# Patient Record
Sex: FEMALE | Race: WHITE | NOT HISPANIC OR LATINO | Employment: OTHER | URBAN - NONMETROPOLITAN AREA
[De-identification: names, ages, dates, MRNs, and addresses within clinical notes are randomized per-mention and may not be internally consistent; named-entity substitution may affect disease eponyms.]

---

## 2022-08-30 ENCOUNTER — OFFICE VISIT (OUTPATIENT)
Dept: FAMILY MEDICINE CLINIC | Age: 82
End: 2022-08-30

## 2022-08-30 ENCOUNTER — LAB (OUTPATIENT)
Dept: LAB | Facility: HOSPITAL | Age: 82
End: 2022-08-30

## 2022-08-30 VITALS
HEIGHT: 63 IN | HEART RATE: 68 BPM | WEIGHT: 206 LBS | SYSTOLIC BLOOD PRESSURE: 138 MMHG | TEMPERATURE: 98.2 F | DIASTOLIC BLOOD PRESSURE: 63 MMHG | BODY MASS INDEX: 36.5 KG/M2

## 2022-08-30 DIAGNOSIS — E78.2 MIXED HYPERLIPIDEMIA: ICD-10-CM

## 2022-08-30 DIAGNOSIS — E66.01 MORBID OBESITY: Chronic | ICD-10-CM

## 2022-08-30 DIAGNOSIS — I87.2 CHRONIC VENOUS INSUFFICIENCY: ICD-10-CM

## 2022-08-30 DIAGNOSIS — I10 ESSENTIAL HYPERTENSION: Primary | ICD-10-CM

## 2022-08-30 DIAGNOSIS — E55.9 VITAMIN D DEFICIENCY: ICD-10-CM

## 2022-08-30 DIAGNOSIS — I63.9 CEREBROVASCULAR ACCIDENT (CVA), UNSPECIFIED MECHANISM: ICD-10-CM

## 2022-08-30 DIAGNOSIS — M79.7 FIBROMYALGIA: ICD-10-CM

## 2022-08-30 DIAGNOSIS — J30.1 SEASONAL ALLERGIC RHINITIS DUE TO POLLEN: ICD-10-CM

## 2022-08-30 DIAGNOSIS — Z79.899 OTHER LONG TERM (CURRENT) DRUG THERAPY: ICD-10-CM

## 2022-08-30 DIAGNOSIS — Z78.0 ASYMPTOMATIC POSTMENOPAUSAL STATE: ICD-10-CM

## 2022-08-30 DIAGNOSIS — R73.9 HYPERGLYCEMIA: ICD-10-CM

## 2022-08-30 DIAGNOSIS — I10 ESSENTIAL HYPERTENSION: ICD-10-CM

## 2022-08-30 DIAGNOSIS — Z12.31 BREAST CANCER SCREENING BY MAMMOGRAM: ICD-10-CM

## 2022-08-30 LAB
25(OH)D3 SERPL-MCNC: 82.3 NG/ML (ref 30–100)
ALBUMIN SERPL-MCNC: 4.3 G/DL (ref 3.5–5.2)
ALBUMIN/GLOB SERPL: 1.8 G/DL
ALP SERPL-CCNC: 45 U/L (ref 39–117)
ALT SERPL W P-5'-P-CCNC: 23 U/L (ref 1–33)
ANION GAP SERPL CALCULATED.3IONS-SCNC: 9.7 MMOL/L (ref 5–15)
AST SERPL-CCNC: 22 U/L (ref 1–32)
BILIRUB SERPL-MCNC: 0.5 MG/DL (ref 0–1.2)
BUN SERPL-MCNC: 12 MG/DL (ref 8–23)
BUN/CREAT SERPL: 17.9 (ref 7–25)
CALCIUM SPEC-SCNC: 9.7 MG/DL (ref 8.6–10.5)
CHLORIDE SERPL-SCNC: 99 MMOL/L (ref 98–107)
CHOLEST SERPL-MCNC: 201 MG/DL (ref 0–200)
CO2 SERPL-SCNC: 30.3 MMOL/L (ref 22–29)
CREAT SERPL-MCNC: 0.67 MG/DL (ref 0.57–1)
DEPRECATED RDW RBC AUTO: 47.2 FL (ref 37–54)
EGFRCR SERPLBLD CKD-EPI 2021: 88.5 ML/MIN/1.73
ERYTHROCYTE [DISTWIDTH] IN BLOOD BY AUTOMATED COUNT: 13.1 % (ref 12.3–15.4)
GLOBULIN UR ELPH-MCNC: 2.4 GM/DL
GLUCOSE SERPL-MCNC: 94 MG/DL (ref 65–99)
HBA1C MFR BLD: 6.2 % (ref 4.8–5.6)
HCT VFR BLD AUTO: 43.8 % (ref 34–46.6)
HDLC SERPL-MCNC: 47 MG/DL (ref 40–60)
HGB BLD-MCNC: 13.7 G/DL (ref 12–15.9)
LDLC SERPL CALC-MCNC: 124 MG/DL (ref 0–100)
LDLC/HDLC SERPL: 2.55 {RATIO}
MCH RBC QN AUTO: 30.3 PG (ref 26.6–33)
MCHC RBC AUTO-ENTMCNC: 31.3 G/DL (ref 31.5–35.7)
MCV RBC AUTO: 96.9 FL (ref 79–97)
PLATELET # BLD AUTO: 230 10*3/MM3 (ref 140–450)
PMV BLD AUTO: 9.5 FL (ref 6–12)
POTASSIUM SERPL-SCNC: 4.2 MMOL/L (ref 3.5–5.2)
PROT SERPL-MCNC: 6.7 G/DL (ref 6–8.5)
RBC # BLD AUTO: 4.52 10*6/MM3 (ref 3.77–5.28)
SODIUM SERPL-SCNC: 139 MMOL/L (ref 136–145)
TRIGL SERPL-MCNC: 171 MG/DL (ref 0–150)
TSH SERPL DL<=0.05 MIU/L-ACNC: 1.84 UIU/ML (ref 0.27–4.2)
VLDLC SERPL-MCNC: 30 MG/DL (ref 5–40)
WBC NRBC COR # BLD: 6.03 10*3/MM3 (ref 3.4–10.8)

## 2022-08-30 PROCEDURE — 82306 VITAMIN D 25 HYDROXY: CPT

## 2022-08-30 PROCEDURE — 84443 ASSAY THYROID STIM HORMONE: CPT

## 2022-08-30 PROCEDURE — 36415 COLL VENOUS BLD VENIPUNCTURE: CPT

## 2022-08-30 PROCEDURE — 80053 COMPREHEN METABOLIC PANEL: CPT

## 2022-08-30 PROCEDURE — 80061 LIPID PANEL: CPT

## 2022-08-30 PROCEDURE — 85027 COMPLETE CBC AUTOMATED: CPT

## 2022-08-30 PROCEDURE — 83036 HEMOGLOBIN GLYCOSYLATED A1C: CPT

## 2022-08-30 PROCEDURE — 99204 OFFICE O/P NEW MOD 45 MIN: CPT | Performed by: FAMILY MEDICINE

## 2022-08-30 RX ORDER — MULTIPLE VITAMINS W/ MINERALS TAB 9MG-400MCG
1 TAB ORAL DAILY
COMMUNITY

## 2022-08-30 RX ORDER — DULOXETIN HYDROCHLORIDE 20 MG/1
1 CAPSULE, DELAYED RELEASE ORAL DAILY
COMMUNITY
End: 2022-08-30 | Stop reason: SDUPTHER

## 2022-08-30 RX ORDER — DULOXETIN HYDROCHLORIDE 20 MG/1
20 CAPSULE, DELAYED RELEASE ORAL DAILY
Qty: 90 CAPSULE | Refills: 3 | Status: SHIPPED | OUTPATIENT
Start: 2022-08-30

## 2022-08-30 RX ORDER — VALSARTAN AND HYDROCHLOROTHIAZIDE 320; 12.5 MG/1; MG/1
1 TABLET, FILM COATED ORAL DAILY
COMMUNITY
End: 2022-08-30 | Stop reason: SDUPTHER

## 2022-08-30 RX ORDER — AMLODIPINE BESYLATE 5 MG/1
1 TABLET ORAL DAILY
COMMUNITY
End: 2022-08-30 | Stop reason: SDUPTHER

## 2022-08-30 RX ORDER — AMLODIPINE BESYLATE 5 MG/1
5 TABLET ORAL NIGHTLY
Qty: 90 TABLET | Refills: 3 | Status: SHIPPED | OUTPATIENT
Start: 2022-08-30

## 2022-08-30 RX ORDER — VALSARTAN AND HYDROCHLOROTHIAZIDE 320; 12.5 MG/1; MG/1
1 TABLET, FILM COATED ORAL DAILY
Qty: 90 TABLET | Refills: 3 | Status: SHIPPED | OUTPATIENT
Start: 2022-08-30

## 2022-08-30 RX ORDER — EZETIMIBE 10 MG/1
10 TABLET ORAL NIGHTLY
Qty: 90 TABLET | Refills: 3 | Status: SHIPPED | OUTPATIENT
Start: 2022-08-30

## 2022-08-30 RX ORDER — LORATADINE 10 MG/1
10 TABLET ORAL DAILY
COMMUNITY

## 2022-08-30 RX ORDER — AMMONIUM LACTATE 12 G/100G
CREAM TOPICAL 2 TIMES DAILY
Qty: 385 G | Refills: 3 | Status: SHIPPED | OUTPATIENT
Start: 2022-08-30

## 2022-08-30 RX ORDER — AMMONIUM LACTATE 12 G/100G
CREAM TOPICAL 2 TIMES DAILY
COMMUNITY
End: 2022-08-30 | Stop reason: SDUPTHER

## 2022-08-30 RX ORDER — FLUTICASONE PROPIONATE 50 MCG
1 SPRAY, SUSPENSION (ML) NASAL DAILY
COMMUNITY
End: 2022-08-30 | Stop reason: SDUPTHER

## 2022-08-30 RX ORDER — FLUTICASONE PROPIONATE 50 MCG
2 SPRAY, SUSPENSION (ML) NASAL DAILY
Qty: 48 G | Refills: 3 | Status: SHIPPED | OUTPATIENT
Start: 2022-08-30

## 2022-08-30 RX ORDER — EZETIMIBE 10 MG/1
1 TABLET ORAL DAILY
COMMUNITY
End: 2022-08-30 | Stop reason: SDUPTHER

## 2022-08-30 RX ORDER — ASPIRIN 81 MG/1
1 TABLET, CHEWABLE ORAL DAILY
COMMUNITY

## 2022-08-30 NOTE — PROGRESS NOTES
Stephani Etienne presents to [facility].    Chief Complaint:  To establish, blood pressure, cholesterol, fibromyalgia, chronic venous insufficiency    Subjective       History of Present Illness:  Stephani is being seen today to establish with us.  I see some other members of her family, and she is new to the area.  Her most significant past medical history includes 2 previous strokes.  She functions well, but she has been left with some difficulty with distinguishing her right from her left.  There has not been any significant change in this recently.  She continues on treatments for hypertension and hyperlipidemia.  She also has some other health problems as noted below for which she needs refills and possibly blood work.    Review of Systems:  Review of Systems   Constitutional: Negative for chills and fever.   Respiratory: Negative for cough and shortness of breath.    Cardiovascular: Negative for chest pain and palpitations.   Gastrointestinal: Negative for abdominal pain, nausea and vomiting.        Objective   Medical History:  Past Medical History:   • Essential hypertension   • Fibromyalgia   • History of DVT (deep vein thrombosis)   • Mixed hyperlipidemia   • Stroke (HCC)    x 2   • Vitamin D deficiency     Past Surgical History:   • BREAST BIOPSY    Benign   • CATARACT EXTRACTION   • POSTPARTUM TUBAL LIGATION   • SKIN CANCER EXCISION    Nose   • TOTAL HIP ARTHROPLASTY   • TOTAL KNEE ARTHROPLASTY   • VARICOSE VEIN SURGERY      Family History   Problem Relation Age of Onset   • No Known Problems Mother    • Stroke Father    • Lung cancer Sister    • COPD Brother    • Coronary artery disease Brother      Social History     Tobacco Use   • Smoking status: Never Smoker   • Smokeless tobacco: Never Used   Substance Use Topics   • Alcohol use: Never       Health Maintenance Due   Topic Date Due   • DXA SCAN  Never done   • ZOSTER VACCINE (1 of 2) Never done   • Pneumococcal Vaccine  "65+ (1 - PCV) Never done   • ANNUAL WELLNESS VISIT  Never done   • LIPID PANEL  Never done        Immunization History   Administered Date(s) Administered   • COVID-19 (PFIZER) PURPLE CAP 02/24/2021, 03/17/2021, 05/31/2022   • FLUAD TRI 65YR+ 11/08/2016, 10/09/2018   • Fluzone High Dose =>65 Years ([employer] ONLY) 12/20/2017, 11/06/2019   • Fluzone High-Dose 65+yrs 12/11/2020   • Tdap 11/04/2014       Allergies   Allergen Reactions   • Atorvastatin Myalgia   • Elastic Bandages & [Zinc] Other (See Comments)     \"Im allergic to any kind of elastic\"   • Morphine Other (See Comments)   • Oxycodone-Acetaminophen GI Intolerance   • Pravastatin Other (See Comments)     \"just makes me feel bad\"   • Valproic Acid Unknown - Low Severity   • Iodine Rash   • Mercury Rash        Medications:  Current Outpatient Medications on File Prior to Visit   Medication Sig   • aspirin 81 MG chewable tablet Chew 1 tablet Daily.   • loratadine (Claritin) 10 MG tablet Take 10 mg by mouth Daily.   • multivitamin with minerals tablet tablet Take 1 tablet by mouth Daily.   • polyethyl glycol-propyl glycol (SYSTANE) 0.4-0.3 % solution ophthalmic solution (artificial tears) Administer 1 drop to both eyes Daily As Needed.   • vitamin D3 (Vitamin D) 125 MCG (5000 UT) capsule capsule Take 5,000 Units by mouth Daily.   • [DISCONTINUED] amLODIPine (NORVASC) 5 MG tablet Take 1 tablet by mouth Daily.   • [DISCONTINUED] ammonium lactate (AMLACTIN) 12 % cream 2 (Two) Times a Day. ammonium lactate 12 % topical cream   • [DISCONTINUED] DULoxetine (CYMBALTA) 20 MG capsule Take 1 capsule by mouth Daily.   • [DISCONTINUED] ezetimibe (ZETIA) 10 MG tablet Take 1 tablet by mouth Daily.   • [DISCONTINUED] fluticasone (FLONASE) 50 MCG/ACT nasal spray 1 spray by Each Nare route Daily.   • [DISCONTINUED] valsartan-hydrochlorothiazide (DIOVAN-HCT) 320-12.5 MG per tablet Take 1 tablet by mouth Daily.     No current facility-administered medications on file prior to visit. " "      Vital Signs:   /63 (BP Location: Right arm, Patient Position: Sitting)   Pulse 68   Temp 98.2 °F (36.8 °C) (Oral)   Ht 160 cm (63\")   Wt 93.4 kg (206 lb)   BMI 36.49 kg/m²       Physical Exam:  Physical Exam  Vitals and nursing note reviewed.   Constitutional:       General: She is not in acute distress.     Appearance: She is obese. She is not ill-appearing.   HENT:      Right Ear: Tympanic membrane and ear canal normal.      Left Ear: Tympanic membrane and ear canal normal.      Mouth/Throat:      Mouth: Mucous membranes are moist.      Comments: Pharynx appears normal  Eyes:      Extraocular Movements: Extraocular movements intact.      Pupils: Pupils are equal, round, and reactive to light.   Neck:      Thyroid: No thyromegaly.   Cardiovascular:      Rate and Rhythm: Normal rate and regular rhythm.      Heart sounds: No murmur heard.  Pulmonary:      Effort: Pulmonary effort is normal.      Breath sounds: Normal breath sounds.   Abdominal:      General: There is no distension.      Palpations: Abdomen is soft. There is no mass.      Tenderness: There is no abdominal tenderness.   Musculoskeletal:      Cervical back: Normal range of motion.      Right lower leg: Edema (There is some edema of both lower legs.  This is more significant on the left side where there is significant skin changes from chronic venous insufficiency.) present.      Left lower leg: Edema present.   Skin:     Findings: No lesion or rash.   Neurological:      General: No focal deficit present.      Mental Status: She is oriented to person, place, and time.      Cranial Nerves: No cranial nerve deficit.   Psychiatric:         Mood and Affect: Mood normal.         Result Review      The following data was reviewed by Toño Chiang MD on 08/30/2022.  Testing from [place] provider.         Assessment and Plan:   Today, we have reviewed Stephani's care.  She seems to be doing fairly well at this time and is getting " acclimated to the area.  We will refill needed medications and update labs as noted below.  Referrals for mammogram and DEXA.  We will plan to see her back in about 6 months for wellness exam.  No other short-term change is anticipated.       Diagnoses and all orders for this visit:    1. Essential hypertension (Primary)  -     Comprehensive Metabolic Panel; Future  -     valsartan-hydrochlorothiazide (DIOVAN-HCT) 320-12.5 MG per tablet; Take 1 tablet by mouth Daily.  Dispense: 90 tablet; Refill: 3  -     amLODIPine (NORVASC) 5 MG tablet; Take 1 tablet by mouth Every Night.  Dispense: 90 tablet; Refill: 3    2. Mixed hyperlipidemia  -     Comprehensive Metabolic Panel; Future  -     Lipid Panel; Future  -     TSH; Future  -     ezetimibe (ZETIA) 10 MG tablet; Take 1 tablet by mouth Every Night.  Dispense: 90 tablet; Refill: 3    3. Cerebrovascular accident (CVA), unspecified mechanism (HCC)  -     CBC (No Diff); Future    4. Hyperglycemia  -     Hemoglobin A1c; Future    5. Morbid obesity (HCC)  Comments:  Continue efforts toward gradual weight loss.    6. Chronic venous insufficiency  -     ammonium lactate (AMLACTIN) 12 % cream; Apply  topically to the appropriate area as directed 2 (Two) Times a Day. ammonium lactate 12 % topical cream  Dispense: 385 g; Refill: 3    7. Vitamin D deficiency  -     Vitamin D 25 hydroxy; Future    8. Other long term (current) drug therapy  -     CBC (No Diff); Future    9. Seasonal allergic rhinitis due to pollen  -     fluticasone (FLONASE) 50 MCG/ACT nasal spray; 2 sprays into the nostril(s) as directed by provider Daily.  Dispense: 48 g; Refill: 3    10. Fibromyalgia  -     DULoxetine (CYMBALTA) 20 MG capsule; Take 1 capsule by mouth Daily.  Dispense: 90 capsule; Refill: 3    11. Breast cancer screening by mammogram  -     Mammo Screening Digital Tomosynthesis Bilateral With CAD; Future    12. Asymptomatic postmenopausal state  -     DEXA Bone Density Axial;  Future          Follow Up   Return in about 6 months (around 2/28/2023) for Recheck, Medicare Wellness.  Patient was given instructions and counseling regarding her condition or for health maintenance advice. Please see specific information pulled into the AVS if appropriate.

## 2022-10-24 ENCOUNTER — APPOINTMENT (OUTPATIENT)
Dept: MAMMOGRAPHY | Facility: HOSPITAL | Age: 82
End: 2022-10-24

## 2022-10-25 ENCOUNTER — HOSPITAL ENCOUNTER (OUTPATIENT)
Dept: MAMMOGRAPHY | Facility: HOSPITAL | Age: 82
Discharge: HOME | End: 2022-10-25

## 2022-10-25 ENCOUNTER — HOSPITAL ENCOUNTER (OUTPATIENT)
Dept: BONE DENSITY | Facility: HOSPITAL | Age: 82
Discharge: HOME | End: 2022-10-25

## 2022-10-25 DIAGNOSIS — Z12.31 BREAST CANCER SCREENING BY MAMMOGRAM: ICD-10-CM

## 2022-10-25 DIAGNOSIS — Z78.0 ASYMPTOMATIC POSTMENOPAUSAL STATE: ICD-10-CM

## 2022-10-25 PROCEDURE — 77063 BREAST TOMOSYNTHESIS BI: CPT

## 2022-10-25 PROCEDURE — 77080 DXA BONE DENSITY AXIAL: CPT

## 2022-10-25 PROCEDURE — 77067 SCR MAMMO BI INCL CAD: CPT

## 2023-02-15 ENCOUNTER — OFFICE VISIT (OUTPATIENT)
Dept: FAMILY MEDICINE CLINIC | Age: 83
End: 2023-02-15
Payer: MEDICARE

## 2023-02-15 VITALS
WEIGHT: 205.8 LBS | BODY MASS INDEX: 36.46 KG/M2 | SYSTOLIC BLOOD PRESSURE: 129 MMHG | DIASTOLIC BLOOD PRESSURE: 73 MMHG | HEIGHT: 63 IN | HEART RATE: 90 BPM

## 2023-02-15 DIAGNOSIS — N95.0 POST-MENOPAUSAL BLEEDING: ICD-10-CM

## 2023-02-15 DIAGNOSIS — N93.9 VAGINAL BLEEDING: Primary | ICD-10-CM

## 2023-02-15 DIAGNOSIS — R31.9 HEMATURIA, UNSPECIFIED TYPE: ICD-10-CM

## 2023-02-15 LAB
BILIRUB BLD-MCNC: NEGATIVE MG/DL
CLARITY, POC: CLEAR
COLOR UR: YELLOW
EXPIRATION DATE: ABNORMAL
GLUCOSE UR STRIP-MCNC: NEGATIVE MG/DL
KETONES UR QL: NEGATIVE
LEUKOCYTE EST, POC: ABNORMAL
Lab: ABNORMAL
NITRITE UR-MCNC: NEGATIVE MG/ML
PH UR: 7.5 [PH] (ref 5–8)
PROT UR STRIP-MCNC: ABNORMAL MG/DL
RBC # UR STRIP: ABNORMAL /UL
SP GR UR: 1.01 (ref 1–1.03)
UROBILINOGEN UR QL: ABNORMAL

## 2023-02-15 PROCEDURE — 81003 URINALYSIS AUTO W/O SCOPE: CPT | Performed by: NURSE PRACTITIONER

## 2023-02-15 PROCEDURE — 87086 URINE CULTURE/COLONY COUNT: CPT | Performed by: NURSE PRACTITIONER

## 2023-02-15 PROCEDURE — 99214 OFFICE O/P EST MOD 30 MIN: CPT | Performed by: NURSE PRACTITIONER

## 2023-02-15 NOTE — PROGRESS NOTES
"Chief Complaint  Stephani Etienne presents to [facility] for Vaginal Bleeding (Pt states she noticed today, bright red blood, pt denies any pain)      Subjective     History of Present Illness  Ms. Etienne reports that she noticed blood when wiping started this morning.  She noticed that she had a spot of blood on her underwear. She is unsure if this is coming from her rectum, urethra or vaginal area.  No current symptoms including denies abdominal pain, rectal pain or pressure, or vaginal pain.  No history of any vaginal complaints in self or family. She takes a baby asa daily.           Assessment and Plan       Diagnoses and all orders for this visit:    1. Vaginal bleeding (Primary)  Comments:  stat transvaginal US and referral to GYN for management  Orders:  -     US Non-ob Transvaginal; Future  -     Ambulatory Referral to Gynecology    2. Post-menopausal bleeding  Comments:  referral to GYN  Orders:  -     US Non-ob Transvaginal; Future  -     Ambulatory Referral to Gynecology    3. Hematuria, unspecified type  Comments:  suspect this is from the vaginal bleeding- will send for culture for evaluation   Orders:  -     Urine Culture - Urine, Urine, Clean Catch  -     POCT urinalysis dipstick, automated        Follow Up   Return for Pending imaging results.      No orders of the defined types were placed in this encounter.      There are no discontinued medications.         Review of Systems   Genitourinary: Positive for vaginal bleeding. Negative for pelvic pain and pelvic pressure.       Objective     Vitals:    02/15/23 1342   BP: 129/73   BP Location: Left arm   Patient Position: Sitting   Pulse: 90   Weight: 93.4 kg (205 lb 12.8 oz)   Height: 160 cm (63\")     Body mass index is 36.46 kg/m².     Physical Exam  Constitutional:       General: She is not in acute distress.     Appearance: Normal appearance.   HENT:      Head: Normocephalic.   Cardiovascular:      Rate and " "Rhythm: Normal rate and regular rhythm.   Pulmonary:      Effort: Pulmonary effort is normal.      Breath sounds: Normal breath sounds.   Abdominal:      Tenderness: There is no abdominal tenderness.   Genitourinary:     Exam position: Knee-chest position.      Vagina: Bleeding present. Other prolapse of vaginal walls w/o mention of uterine prolapse: unsure if bladder/uterine prolapse vs mass  unable to determine due to blocking vaginal canal.   Musculoskeletal:         General: Normal range of motion.   Neurological:      General: No focal deficit present.      Mental Status: She is alert and oriented to person, place, and time.   Psychiatric:         Mood and Affect: Mood normal.         Behavior: Behavior normal.            Result Review                       Allergies   Allergen Reactions   • Atorvastatin Myalgia   • Elastic Bandages & [Zinc] Other (See Comments)     \"Im allergic to any kind of elastic\"   • Morphine Other (See Comments)   • Oxycodone-Acetaminophen GI Intolerance   • Pravastatin Other (See Comments)     \"just makes me feel bad\"   • Valproic Acid Unknown - Low Severity   • Iodine Rash   • Mercury Rash      Past Medical History:   Diagnosis Date   • Essential hypertension    • Fibromyalgia    • History of DVT (deep vein thrombosis)    • Mixed hyperlipidemia    • Stroke (HCC)     x 2   • Vitamin D deficiency      Current Outpatient Medications   Medication Sig Dispense Refill   • amLODIPine (NORVASC) 5 MG tablet Take 1 tablet by mouth Every Night. 90 tablet 3   • ammonium lactate (AMLACTIN) 12 % cream Apply  topically to the appropriate area as directed 2 (Two) Times a Day. ammonium lactate 12 % topical cream 385 g 3   • aspirin 81 MG chewable tablet Chew 1 tablet Daily.     • DULoxetine (CYMBALTA) 20 MG capsule Take 1 capsule by mouth Daily. 90 capsule 3   • ezetimibe (ZETIA) 10 MG tablet Take 1 tablet by mouth Every Night. 90 tablet 3   • fluticasone (FLONASE) 50 MCG/ACT nasal spray 2 sprays into " the nostril(s) as directed by provider Daily. 48 g 3   • loratadine (CLARITIN) 10 MG tablet Take 10 mg by mouth Daily.     • multivitamin with minerals tablet tablet Take 1 tablet by mouth Daily.     • polyethyl glycol-propyl glycol (SYSTANE) 0.4-0.3 % solution ophthalmic solution (artificial tears) Administer 1 drop to both eyes Daily As Needed.     • valsartan-hydrochlorothiazide (DIOVAN-HCT) 320-12.5 MG per tablet Take 1 tablet by mouth Daily. 90 tablet 3   • vitamin D3 125 MCG (5000 UT) capsule capsule Take 5,000 Units by mouth Daily.       No current facility-administered medications for this visit.     Past Surgical History:   Procedure Laterality Date   • BREAST BIOPSY      Benign   • CATARACT EXTRACTION     • POSTPARTUM TUBAL LIGATION     • SKIN CANCER EXCISION      Nose   • TOTAL HIP ARTHROPLASTY Right    • TOTAL KNEE ARTHROPLASTY Left    • VARICOSE VEIN SURGERY        Health Maintenance Due   Topic Date Due   • ZOSTER VACCINE (1 of 2) Never done   • Pneumococcal Vaccine 65+ (1 - PCV) Never done   • COVID-19 Vaccine (4 - Booster for Pfizer series) 07/26/2022   • INFLUENZA VACCINE  08/01/2022   • ANNUAL WELLNESS VISIT  02/02/2023      Immunization History   Administered Date(s) Administered   • COVID-19 (PFIZER) PURPLE CAP 02/24/2021, 03/17/2021, 05/31/2022   • FLUAD TRI 65YR+ 11/08/2016, 10/09/2018   • Fluzone High Dose =>65 Years ([employer] ONLY) 12/20/2017, 11/06/2019   • Fluzone High-Dose 65+yrs 12/11/2020   • Tdap 11/04/2014

## 2023-02-16 ENCOUNTER — HOSPITAL ENCOUNTER (OUTPATIENT)
Dept: ULTRASOUND IMAGING | Facility: HOSPITAL | Age: 83
Discharge: HOME | End: 2023-02-16
Admitting: NURSE PRACTITIONER
Payer: MEDICARE

## 2023-02-16 DIAGNOSIS — N93.9 VAGINAL BLEEDING: ICD-10-CM

## 2023-02-16 DIAGNOSIS — N95.0 POST-MENOPAUSAL BLEEDING: ICD-10-CM

## 2023-02-16 PROCEDURE — 76830 TRANSVAGINAL US NON-OB: CPT

## 2023-02-17 LAB — BACTERIA SPEC AEROBE CULT: NORMAL

## 2023-06-06 ENCOUNTER — OFFICE VISIT (OUTPATIENT)
Dept: FAMILY MEDICINE CLINIC | Age: 83
End: 2023-06-06
Payer: MEDICARE

## 2023-06-06 VITALS
HEART RATE: 66 BPM | TEMPERATURE: 98.2 F | HEIGHT: 63 IN | WEIGHT: 203.6 LBS | SYSTOLIC BLOOD PRESSURE: 138 MMHG | DIASTOLIC BLOOD PRESSURE: 56 MMHG | BODY MASS INDEX: 36.07 KG/M2

## 2023-06-06 DIAGNOSIS — I63.9 CEREBROVASCULAR ACCIDENT (CVA), UNSPECIFIED MECHANISM: ICD-10-CM

## 2023-06-06 DIAGNOSIS — E78.2 MIXED HYPERLIPIDEMIA: ICD-10-CM

## 2023-06-06 DIAGNOSIS — M79.7 FIBROMYALGIA: ICD-10-CM

## 2023-06-06 DIAGNOSIS — J30.1 SEASONAL ALLERGIC RHINITIS DUE TO POLLEN: ICD-10-CM

## 2023-06-06 DIAGNOSIS — Z23 ENCOUNTER FOR IMMUNIZATION: ICD-10-CM

## 2023-06-06 DIAGNOSIS — I87.2 CHRONIC VENOUS INSUFFICIENCY: ICD-10-CM

## 2023-06-06 DIAGNOSIS — I10 ESSENTIAL HYPERTENSION: ICD-10-CM

## 2023-06-06 DIAGNOSIS — Z00.00 PHYSICAL EXAM: Primary | ICD-10-CM

## 2023-06-06 RX ORDER — AMLODIPINE BESYLATE 5 MG/1
5 TABLET ORAL NIGHTLY
Qty: 90 TABLET | Refills: 3 | Status: SHIPPED | OUTPATIENT
Start: 2023-06-06

## 2023-06-06 RX ORDER — VALSARTAN AND HYDROCHLOROTHIAZIDE 320; 12.5 MG/1; MG/1
1 TABLET, FILM COATED ORAL DAILY
Qty: 90 TABLET | Refills: 3 | Status: SHIPPED | OUTPATIENT
Start: 2023-06-06

## 2023-06-06 RX ORDER — AMMONIUM LACTATE 12 G/100G
CREAM TOPICAL 2 TIMES DAILY
Qty: 385 G | Refills: 3 | Status: SHIPPED | OUTPATIENT
Start: 2023-06-06

## 2023-06-06 RX ORDER — EZETIMIBE 10 MG/1
10 TABLET ORAL NIGHTLY
Qty: 90 TABLET | Refills: 3 | Status: SHIPPED | OUTPATIENT
Start: 2023-06-06

## 2023-06-06 RX ORDER — FLUTICASONE PROPIONATE 50 MCG
2 SPRAY, SUSPENSION (ML) NASAL DAILY
Qty: 48 G | Refills: 3 | Status: SHIPPED | OUTPATIENT
Start: 2023-06-06

## 2023-06-06 RX ORDER — ACETAMINOPHEN 500 MG
500 TABLET ORAL EVERY 6 HOURS PRN
COMMUNITY

## 2023-06-06 RX ORDER — DULOXETIN HYDROCHLORIDE 20 MG/1
20 CAPSULE, DELAYED RELEASE ORAL DAILY
Qty: 90 CAPSULE | Refills: 3 | Status: SHIPPED | OUTPATIENT
Start: 2023-06-06

## 2023-06-06 NOTE — PROGRESS NOTES
The ABCs of the Annual Wellness Visit  Subsequent Medicare Wellness Visit    Subjective    Stephani Etienne is a 82 y.o. female who presents for a Subsequent Medicare Wellness Visit.    The following portions of the patient's history were reviewed and updated as appropriate: allergies, current medications, past family history, past medical history, past social history, past surgical history, and problem list.    Compared to one year ago, the patient feels her physical health is the same.    Compared to one year ago, the patient feels her mental health is the same.    Recent Hospitalizations:  She was admitted within the past 365 days at [facility] for hysterectomy.    Current Medical Providers:  Patient Care Team:  Toño Chiang MD as PCP - General (Family Medicine)  Raul Adams MD as Consulting Physician (Radiation Oncology)  Alvaro Mackey DO (Obstetrics and Gynecology)    Outpatient Medications Prior to Visit   Medication Sig Dispense Refill    acetaminophen (TYLENOL) 500 MG tablet Take 1 tablet by mouth Every 6 (Six) Hours As Needed.      aspirin 81 MG chewable tablet Chew 1 tablet Daily.      Cholecalciferol 50 MCG (2000 UT) tablet Take  by mouth Daily.      multivitamin with minerals tablet tablet Take 1 tablet by mouth Daily.      polyethyl glycol-propyl glycol (SYSTANE) 0.4-0.3 % solution ophthalmic solution (artificial tears) Administer 1 drop to both eyes Daily As Needed.      amLODIPine (NORVASC) 5 MG tablet Take 1 tablet by mouth Every Night. 90 tablet 3    ammonium lactate (AMLACTIN) 12 % cream Apply  topically to the appropriate area as directed 2 (Two) Times a Day. ammonium lactate 12 % topical cream 385 g 3    DULoxetine (CYMBALTA) 20 MG capsule Take 1 capsule by mouth Daily. 90 capsule 3    ezetimibe (ZETIA) 10 MG tablet Take 1 tablet by mouth Every Night. 90 tablet 3    fluticasone (FLONASE) 50 MCG/ACT nasal spray 2 sprays into the nostril(s) as directed by provider  "Daily. 48 g 3    valsartan-hydrochlorothiazide (DIOVAN-HCT) 320-12.5 MG per tablet Take 1 tablet by mouth Daily. 90 tablet 3    loratadine (CLARITIN) 10 MG tablet Take 10 mg by mouth Daily.      vitamin D3 125 MCG (5000 UT) capsule capsule Take 5,000 Units by mouth Daily.       No facility-administered medications prior to visit.       No opioid medication identified on active medication list. I have reviewed chart for other potential  high risk medication/s and harmful drug interactions in the elderly.      Aspirin is on active medication list. Aspirin use is indicated based on review of current medical condition/s. Pros and cons of this therapy have been discussed today. Benefits of this medication outweigh potential harm.  Patient has been encouraged to continue taking this medication.  .      Patient Active Problem List   Diagnosis    Essential hypertension    Mixed hyperlipidemia    Stroke    Vitamin D deficiency    Fibromyalgia    Chronic venous insufficiency    Morbid obesity     Advance Care Planning  Advance Directive is on file.  ACP discussion was held with the patient during this visit. Patient has an advance directive in EMR which is still valid.      Objective    Vitals:    06/06/23 1107   BP: 138/56   BP Location: Right arm   Patient Position: Sitting   Pulse: 66   Temp: 98.2 °F (36.8 °C)   TempSrc: Oral   Weight: 92.4 kg (203 lb 9.6 oz)   Height: 160 cm (62.99\")     Estimated body mass index is 36.08 kg/m² as calculated from the following:    Height as of this encounter: 160 cm (62.99\").    Weight as of this encounter: 92.4 kg (203 lb 9.6 oz).    Class 2 Severe Obesity (BMI >=35 and <=39.9). Obesity-related health conditions include the following: hypertension and dyslipidemias. Obesity is  mildly better compared to last visit . BMI is is above average; BMI management plan is completed. We discussed portion control and increasing exercise.    Does the patient have evidence of cognitive impairment? " No.        HEALTH RISK ASSESSMENT    Smoking Status:  Social History     Tobacco Use   Smoking Status Never   Smokeless Tobacco Never     Alcohol Consumption:  Social History     Substance and Sexual Activity   Alcohol Use Never     Fall Risk Screen:    KENDYADI Fall Risk Assessment was completed, and patient is at LOW risk for falls.Assessment completed on:2023    Depression Screenin/6/2023    11:02 AM   PHQ-2/PHQ-9 Depression Screening   Little Interest or Pleasure in Doing Things 0-->not at all   Feeling Down, Depressed or Hopeless 0-->not at all   PHQ-9: Brief Depression Severity Measure Score 0       Health Habits and Functional and Cognitive Screenin/6/2023    11:02 AM   Functional & Cognitive Status   Do you have difficulty preparing food and eating? Yes   Do you have difficulty bathing yourself, getting dressed or grooming yourself? No   Do you have difficulty using the toilet? No   Do you have difficulty moving around from place to place? No   Do you have trouble with steps or getting out of a bed or a chair? Yes   Current Diet Well Balanced Diet   Dental Exam Up to date   Eye Exam Up to date   Exercise (times per week) 0 times per week   Current Exercises Include No Regular Exercise   Do you need help using the phone?  No   Are you deaf or do you have serious difficulty hearing?  No   Do you need help with transportation? Yes   Do you need help shopping? No   Do you need help preparing meals?  Yes   Do you need help with housework?  Yes   Do you need help with laundry? No   Do you need help taking your medications? No   Do you need help managing money? No   Do you ever drive or ride in a car without wearing a seat belt? No   Have you felt unusual stress, anger or loneliness in the last month? No   Who do you live with? Child   If you need help, do you have trouble finding someone available to you? No   Have you been bothered in the last four weeks by sexual problems? No   Do you have  difficulty concentrating, remembering or making decisions? No       Age-appropriate Screening Schedule:  Refer to the list below for future screening recommendations based on patient's age, sex and/or medical conditions. Orders for these recommended tests are listed in the plan section. The patient has been provided with a written plan.    Health Maintenance   Topic Date Due    ZOSTER VACCINE (1 of 2) Never done    Pneumococcal Vaccine 65+ (1 - PCV) Never done    COVID-19 Vaccine (4 - Pfizer series) 06/05/2024 (Originally 7/26/2022)    INFLUENZA VACCINE  08/01/2023    LIPID PANEL  08/30/2023    ANNUAL WELLNESS VISIT  06/06/2024    DXA SCAN  10/25/2024    TDAP/TD VACCINES (2 - Td or Tdap) 11/04/2024                CMS Preventative Services Quick Reference  Risk Factors Identified During Encounter  Depression/Dysphoria: Current medication is effective, no change recommended  Hearing Problem:  Continue using hearing aids and follow up with audiology as needed.  Immunizations Discussed/Encouraged: Prevnar 20 (Pneumococcal 20-valent conjugate), Shingrix, and COVID19  The above risks/problems have been discussed with the patient.  Pertinent information has been shared with the patient in the After Visit Summary.  An After Visit Summary and PPPS were made available to the patient.    Follow Up:   Next Medicare Wellness visit to be scheduled in 1 year.       Additional E&M Note during same encounter follows:  Patient has multiple medical problems which are significant and separately identifiable that require additional work above and beyond the Medicare Wellness Visit.      Chief Complaint:  Blood pressure, cholesterol, previous stroke    Subjective        In addition to the Medicare wellness exam, Stephani is here for follow-up on her usual care.  She has hypertension and remains on treatments for it as noted above.  Her blood pressure is fairly well controlled here.  She says that her blood pressure has been in a relatively  "good range most recently.  She is not aware of obvious side effects from the medication.    She also has elevated cholesterol and remains on ezetimibe for this.  She seems to tolerate that fairly well.  She has had difficulty with statin therapy previously.    Review of Systems:  Review of Systems   Constitutional:  Negative for chills and fever.   Respiratory:  Negative for cough and shortness of breath.    Cardiovascular:  Negative for chest pain and palpitations.   Gastrointestinal:  Negative for abdominal pain, nausea and vomiting.      Objective   Vital Signs:  /56 (BP Location: Right arm, Patient Position: Sitting)   Pulse 66   Temp 98.2 °F (36.8 °C) (Oral)   Ht 160 cm (62.99\")   Wt 92.4 kg (203 lb 9.6 oz)   BMI 36.08 kg/m²     Physical Exam  Vitals and nursing note reviewed.   Constitutional:       General: She is not in acute distress.     Appearance: She is obese. She is not ill-appearing.   HENT:      Right Ear: Tympanic membrane and ear canal normal.      Left Ear: Tympanic membrane and ear canal normal.      Ears:      Comments: Hearing is normal with forced whisper with aids in place.     Mouth/Throat:      Mouth: Mucous membranes are moist.      Comments: Pharynx appears normal  Eyes:      Extraocular Movements: Extraocular movements intact.      Pupils: Pupils are equal, round, and reactive to light.      Comments: Binocular vision is 20/20 without correction.   Neck:      Thyroid: No thyromegaly.   Cardiovascular:      Rate and Rhythm: Normal rate and regular rhythm.      Heart sounds: No murmur heard.  Pulmonary:      Effort: Pulmonary effort is normal.      Breath sounds: Normal breath sounds.   Abdominal:      General: There is no distension.      Palpations: Abdomen is soft. There is no mass.      Tenderness: There is no abdominal tenderness.   Musculoskeletal:      Cervical back: Normal range of motion.      Right lower leg: Edema present.      Left lower leg: Edema (There is some " edema of both lower legs. This is more significant on the left side where there is significant skin changes from chronic venous insufficiency.) present.   Skin:     Findings: No lesion or rash.   Neurological:      General: No focal deficit present.      Mental Status: She is oriented to person, place, and time.      Cranial Nerves: No cranial nerve deficit.   Psychiatric:         Mood and Affect: Mood normal.     The following data was reviewed by Toño Chiang MD on 06/06/2023.  Lab Results   Component Value Date    WBC 8.51 03/16/2023    HGB 11.3 (L) 03/16/2023    HCT 35.6 (L) 03/16/2023    MCV 97.5 03/16/2023     03/16/2023     Lab Results   Component Value Date    GLUCOSE 94 08/30/2022    BUN 12 08/30/2022    CREATININE 0.67 08/30/2022     08/30/2022    K 4.2 08/30/2022    CL 99 08/30/2022    CO2 30.3 (H) 08/30/2022    CALCIUM 9.7 08/30/2022    PROTEINTOT 6.7 08/30/2022    ALBUMIN 4.30 08/30/2022    ALT 23 08/30/2022    AST 22 08/30/2022    ALKPHOS 45 08/30/2022    BILITOT 0.5 08/30/2022    EGFR 88.5 08/30/2022    GLOB 2.4 08/30/2022    AGRATIO 1.8 08/30/2022    BCR 17.9 08/30/2022    ANIONGAP 9.7 08/30/2022      Lab Results   Component Value Date    CHOL 201 (H) 08/30/2022    TRIG 171 (H) 08/30/2022    HDL 47 08/30/2022     (H) 08/30/2022     Lab Results   Component Value Date    TSH 1.840 08/30/2022     Lab Results   Component Value Date    HGBA1C 5.9 (H) 03/02/2023      CBC AND DIFFERENTIAL (03/16/2023 04:26)  BASIC METABOLIC PANEL (03/16/2023 04:26)  MAGNESIUM (03/16/2023 04:26)  PHOSPHORUS (03/16/2023 04:26)  CBC AND DIFFERENTIAL (03/15/2023 22:19)         Assessment and Plan:       Today, we have reviewed her care.  Regarding the wellness exam, I am unsure about her vaccine history, and they indicated they might bring some records to review in this regard.  I did send Shingrix to her pharmacy in [place] to consider.    Regarding her usual care, we will refill needed  medications as noted.  Her most recent labs have been reviewed as well.  No short-term changes otherwise anticipated.  Tentative follow-up will be in about 6 months.    Diagnoses and all orders for this visit:    1. Physical exam (Primary)    2. Essential hypertension  -     amLODIPine (NORVASC) 5 MG tablet; Take 1 tablet by mouth Every Night.  Dispense: 90 tablet; Refill: 3  -     valsartan-hydrochlorothiazide (DIOVAN-HCT) 320-12.5 MG per tablet; Take 1 tablet by mouth Daily.  Dispense: 90 tablet; Refill: 3    3. Mixed hyperlipidemia  -     ezetimibe (ZETIA) 10 MG tablet; Take 1 tablet by mouth Every Night.  Dispense: 90 tablet; Refill: 3    4. Cerebrovascular accident (CVA), unspecified mechanism  Comments:  As above.    5. Chronic venous insufficiency  -     ammonium lactate (AMLACTIN) 12 % cream; Apply  topically to the appropriate area as directed 2 (Two) Times a Day. ammonium lactate 12 % topical cream  Dispense: 385 g; Refill: 3    6. Fibromyalgia  -     DULoxetine (CYMBALTA) 20 MG capsule; Take 1 capsule by mouth Daily.  Dispense: 90 capsule; Refill: 3    7. Seasonal allergic rhinitis due to pollen  -     fluticasone (FLONASE) 50 MCG/ACT nasal spray; 2 sprays into the nostril(s) as directed by provider Daily.  Dispense: 48 g; Refill: 3    8. Encounter for immunization  -     Zoster Vac Recomb Adjuvanted 50 MCG/0.5ML reconstituted suspension; Inject 0.5 mL into the appropriate muscle as directed by prescriber 1 (One) Time for 1 dose.  Dispense: 1 each; Refill: 1       Follow Up   Return in about 6 months (around 12/6/2023) for Recheck.  Patient was given instructions and counseling regarding her condition or for health maintenance advice. Please see specific information pulled into the AVS if appropriate.

## 2023-08-17 ENCOUNTER — TRANSCRIBE ORDERS (OUTPATIENT)
Dept: ADMINISTRATIVE | Facility: HOSPITAL | Age: 83
End: 2023-08-17
Payer: MEDICARE

## 2023-08-17 DIAGNOSIS — Z12.31 SCREENING MAMMOGRAM, ENCOUNTER FOR: Primary | ICD-10-CM

## 2023-10-01 DIAGNOSIS — I10 ESSENTIAL HYPERTENSION: ICD-10-CM

## 2023-10-01 DIAGNOSIS — M79.7 FIBROMYALGIA: ICD-10-CM

## 2023-10-01 DIAGNOSIS — E78.2 MIXED HYPERLIPIDEMIA: ICD-10-CM

## 2023-10-02 RX ORDER — DULOXETIN HYDROCHLORIDE 20 MG/1
20 CAPSULE, DELAYED RELEASE ORAL DAILY
Qty: 90 CAPSULE | Refills: 2 | Status: SHIPPED | OUTPATIENT
Start: 2023-10-02

## 2023-10-02 RX ORDER — AMLODIPINE BESYLATE 5 MG/1
5 TABLET ORAL NIGHTLY
Qty: 90 TABLET | Refills: 2 | Status: SHIPPED | OUTPATIENT
Start: 2023-10-02

## 2023-10-02 RX ORDER — EZETIMIBE 10 MG/1
10 TABLET ORAL NIGHTLY
Qty: 90 TABLET | Refills: 2 | Status: SHIPPED | OUTPATIENT
Start: 2023-10-02

## 2023-10-02 RX ORDER — VALSARTAN AND HYDROCHLOROTHIAZIDE 320; 12.5 MG/1; MG/1
1 TABLET, FILM COATED ORAL DAILY
Qty: 90 TABLET | Refills: 2 | Status: SHIPPED | OUTPATIENT
Start: 2023-10-02

## 2023-11-10 ENCOUNTER — HOSPITAL ENCOUNTER (OUTPATIENT)
Dept: MAMMOGRAPHY | Facility: HOSPITAL | Age: 83
Discharge: HOME | End: 2023-11-10
Admitting: FAMILY MEDICINE
Payer: MEDICARE

## 2023-11-10 DIAGNOSIS — Z12.31 SCREENING MAMMOGRAM, ENCOUNTER FOR: ICD-10-CM

## 2023-11-10 PROCEDURE — 77063 BREAST TOMOSYNTHESIS BI: CPT

## 2023-11-10 PROCEDURE — 77067 SCR MAMMO BI INCL CAD: CPT

## 2023-12-14 ENCOUNTER — OFFICE VISIT (OUTPATIENT)
Dept: FAMILY MEDICINE CLINIC | Age: 83
End: 2023-12-14
Payer: MEDICARE

## 2023-12-14 VITALS
DIASTOLIC BLOOD PRESSURE: 64 MMHG | HEART RATE: 67 BPM | WEIGHT: 204 LBS | BODY MASS INDEX: 36.14 KG/M2 | TEMPERATURE: 97.3 F | HEIGHT: 63 IN | SYSTOLIC BLOOD PRESSURE: 134 MMHG

## 2023-12-14 DIAGNOSIS — M79.7 FIBROMYALGIA: ICD-10-CM

## 2023-12-14 DIAGNOSIS — Z91.89 DRIVING SAFETY ISSUE: ICD-10-CM

## 2023-12-14 DIAGNOSIS — I10 ESSENTIAL HYPERTENSION: Primary | ICD-10-CM

## 2023-12-14 DIAGNOSIS — I87.2 CHRONIC VENOUS INSUFFICIENCY: ICD-10-CM

## 2023-12-14 DIAGNOSIS — E78.2 MIXED HYPERLIPIDEMIA: ICD-10-CM

## 2023-12-14 PROCEDURE — 3078F DIAST BP <80 MM HG: CPT | Performed by: FAMILY MEDICINE

## 2023-12-14 PROCEDURE — 99214 OFFICE O/P EST MOD 30 MIN: CPT | Performed by: FAMILY MEDICINE

## 2023-12-14 PROCEDURE — 1159F MED LIST DOCD IN RCRD: CPT | Performed by: FAMILY MEDICINE

## 2023-12-14 PROCEDURE — 3075F SYST BP GE 130 - 139MM HG: CPT | Performed by: FAMILY MEDICINE

## 2023-12-14 PROCEDURE — 1160F RVW MEDS BY RX/DR IN RCRD: CPT | Performed by: FAMILY MEDICINE

## 2023-12-14 RX ORDER — EZETIMIBE 10 MG/1
10 TABLET ORAL NIGHTLY
Qty: 90 TABLET | Refills: 3 | Status: SHIPPED | OUTPATIENT
Start: 2023-12-14

## 2023-12-14 RX ORDER — AMLODIPINE BESYLATE 5 MG/1
5 TABLET ORAL NIGHTLY
Qty: 90 TABLET | Refills: 3 | Status: SHIPPED | OUTPATIENT
Start: 2023-12-14

## 2023-12-14 RX ORDER — AMMONIUM LACTATE 12 G/100G
CREAM TOPICAL 2 TIMES DAILY
Qty: 385 G | Refills: 3 | Status: SHIPPED | OUTPATIENT
Start: 2023-12-14

## 2023-12-14 RX ORDER — VALSARTAN AND HYDROCHLOROTHIAZIDE 320; 12.5 MG/1; MG/1
1 TABLET, FILM COATED ORAL DAILY
Qty: 90 TABLET | Refills: 3 | Status: SHIPPED | OUTPATIENT
Start: 2023-12-14

## 2023-12-14 RX ORDER — DULOXETIN HYDROCHLORIDE 20 MG/1
20 CAPSULE, DELAYED RELEASE ORAL DAILY
Qty: 90 CAPSULE | Refills: 3 | Status: SHIPPED | OUTPATIENT
Start: 2023-12-14
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
## 2024-03-21 ENCOUNTER — OFFICE VISIT (OUTPATIENT)
Dept: FAMILY MEDICINE CLINIC | Age: 84
End: 2024-03-21
Payer: MEDICARE

## 2024-03-21 VITALS
TEMPERATURE: 98.1 F | DIASTOLIC BLOOD PRESSURE: 63 MMHG | OXYGEN SATURATION: 98 % | HEIGHT: 63 IN | SYSTOLIC BLOOD PRESSURE: 168 MMHG | BODY MASS INDEX: 34.59 KG/M2 | WEIGHT: 195.2 LBS | HEART RATE: 65 BPM

## 2024-03-21 DIAGNOSIS — L98.9 SKIN LESION: Primary | ICD-10-CM

## 2024-03-21 DIAGNOSIS — Z85.828 HISTORY OF SKIN CANCER: ICD-10-CM

## 2024-03-21 PROCEDURE — 99213 OFFICE O/P EST LOW 20 MIN: CPT | Performed by: PHYSICIAN ASSISTANT

## 2024-03-21 PROCEDURE — 1159F MED LIST DOCD IN RCRD: CPT | Performed by: PHYSICIAN ASSISTANT

## 2024-03-21 PROCEDURE — 1160F RVW MEDS BY RX/DR IN RCRD: CPT | Performed by: PHYSICIAN ASSISTANT

## 2024-03-21 PROCEDURE — 3078F DIAST BP <80 MM HG: CPT | Performed by: PHYSICIAN ASSISTANT

## 2024-03-21 PROCEDURE — 3077F SYST BP >= 140 MM HG: CPT | Performed by: PHYSICIAN ASSISTANT

## 2024-03-21 NOTE — PROGRESS NOTES
"Subjective     CHIEF COMPLAINT    Chief Complaint   Patient presents with    Rash     Bilateral arms. X 3 months             History of Present Illness  This is an 83-year-old female presenting to the clinic with concern for some skin lesions on her forearms.  She has a past medical history of skin cancer in several places, most notably on her nose that required somewhat extensive Mohs procedure.  She says the lesion on her left arm has been present 3 to 4 months and the lesion on her right arm has been present maybe 2 to 3 months.  The lesion on her right arm is pruritic but neither are painful.  She has been putting vinegar on them at home with minimal improvement.            Review of Systems   Skin:  Positive for wound (Lesions, bilateral forearms).            Past Medical History:   Diagnosis Date    Endometrial cancer     Essential hypertension     Fibromyalgia     History of DVT (deep vein thrombosis)     Mixed hyperlipidemia     Stroke     x 2    Vitamin D deficiency             Past Surgical History:   Procedure Laterality Date    BREAST BIOPSY      Benign    CATARACT EXTRACTION      POSTPARTUM TUBAL LIGATION      SKIN CANCER EXCISION      Nose    TOTAL HIP ARTHROPLASTY Right     TOTAL KNEE ARTHROPLASTY Left     TOTAL LAPAROSCOPIC HYSTERECTOMY AND SALPINGECTOMY      VARICOSE VEIN SURGERY              Family History   Problem Relation Age of Onset    No Known Problems Mother     Stroke Father     Lung cancer Sister     COPD Brother     Coronary artery disease Brother             Social History     Socioeconomic History    Marital status:     Number of children: 2   Tobacco Use    Smoking status: Never    Smokeless tobacco: Never   Substance and Sexual Activity    Alcohol use: Never            Allergies   Allergen Reactions    Atorvastatin Myalgia    Elastic Bandages & [Zinc] Other (See Comments)     \"Im allergic to any kind of elastic\"    Morphine Other (See Comments)    Oxycodone-Acetaminophen GI " "Intolerance    Pravastatin Other (See Comments)     \"just makes me feel bad\"    Valproic Acid Unknown - Low Severity    Iodine Rash    Latex Rash     +ALLERGY TESTING , RX TO ALL ELASTIC PRODUCTS    Mercury Rash    Na Hyalur & Na Chond-Na Hyalur Rash    Red Dye Rash     RX FROM ANYTHING W/RED COLORING PER PT            Current Outpatient Medications on File Prior to Visit   Medication Sig Dispense Refill    acetaminophen (TYLENOL) 500 MG tablet Take 1 tablet by mouth Every 6 (Six) Hours As Needed.      amLODIPine (NORVASC) 5 MG tablet Take 1 tablet by mouth Every Night. 90 tablet 3    ammonium lactate (AMLACTIN) 12 % cream Apply  topically to the appropriate area as directed 2 (Two) Times a Day. ammonium lactate 12 % topical cream 385 g 3    aspirin 81 MG chewable tablet Chew 1 tablet Daily.      Cholecalciferol 50 MCG (2000 UT) tablet Take  by mouth Daily.      DULoxetine (CYMBALTA) 20 MG capsule Take 1 capsule by mouth Daily. 90 capsule 3    ezetimibe (ZETIA) 10 MG tablet Take 1 tablet by mouth Every Night. 90 tablet 3    fluticasone (FLONASE) 50 MCG/ACT nasal spray 2 sprays into the nostril(s) as directed by provider Daily. 48 g 3    multivitamin with minerals tablet tablet Take 1 tablet by mouth Daily.      polyethyl glycol-propyl glycol (SYSTANE) 0.4-0.3 % solution ophthalmic solution (artificial tears) Administer 1 drop to both eyes Daily As Needed.      valsartan-hydrochlorothiazide (DIOVAN-HCT) 320-12.5 MG per tablet Take 1 tablet by mouth Daily. 90 tablet 3     No current facility-administered medications on file prior to visit.            /63   Pulse 65   Temp 98.1 °F (36.7 °C) (Oral)   Ht 160 cm (62.99\")   Wt 88.5 kg (195 lb 3.2 oz)   SpO2 98% Comment: room air  BMI 34.59 kg/m²          Objective     Physical Exam  Vitals and nursing note reviewed.   Constitutional:       General: She is not in acute distress.     Appearance: Normal appearance.   Eyes:      General: No scleral icterus.     " Conjunctiva/sclera: Conjunctivae normal.   Pulmonary:      Effort: Pulmonary effort is normal. No respiratory distress.   Skin:     General: Skin is warm and dry.      Findings: Lesion (See images below) present.   Neurological:      Mental Status: She is alert and oriented to person, place, and time.   Psychiatric:         Mood and Affect: Mood normal.         Behavior: Behavior normal.                               Assessment & Plan  Skin lesion  Referral to dermatology placed.  Advised that is probably okay to continue with white vinegar as this is what her former dermatologist had advised her to do.  History of skin cancer                     FOR FULL DISCHARGE INSTRUCTIONS/COMMENTS/HANDOUTS please see the   AVS

## 2024-06-11 ENCOUNTER — OFFICE VISIT (OUTPATIENT)
Dept: FAMILY MEDICINE CLINIC | Age: 84
End: 2024-06-11
Payer: MEDICARE

## 2024-06-11 VITALS
TEMPERATURE: 98 F | SYSTOLIC BLOOD PRESSURE: 148 MMHG | HEIGHT: 63 IN | DIASTOLIC BLOOD PRESSURE: 83 MMHG | WEIGHT: 192.2 LBS | BODY MASS INDEX: 34.05 KG/M2 | HEART RATE: 69 BPM | OXYGEN SATURATION: 96 %

## 2024-06-11 DIAGNOSIS — Z23 IMMUNIZATION DUE: ICD-10-CM

## 2024-06-11 DIAGNOSIS — Z00.00 MEDICARE ANNUAL WELLNESS VISIT, SUBSEQUENT: Primary | ICD-10-CM

## 2024-06-11 DIAGNOSIS — H91.93 DECREASED HEARING OF BOTH EARS: ICD-10-CM

## 2024-06-11 DIAGNOSIS — Z13.31 ENCOUNTER FOR SCREENING FOR DEPRESSION: ICD-10-CM

## 2024-06-11 PROCEDURE — 1159F MED LIST DOCD IN RCRD: CPT

## 2024-06-11 PROCEDURE — 3079F DIAST BP 80-89 MM HG: CPT

## 2024-06-11 PROCEDURE — G0439 PPPS, SUBSEQ VISIT: HCPCS

## 2024-06-11 PROCEDURE — 1170F FXNL STATUS ASSESSED: CPT

## 2024-06-11 PROCEDURE — 3077F SYST BP >= 140 MM HG: CPT

## 2024-06-11 PROCEDURE — 1160F RVW MEDS BY RX/DR IN RCRD: CPT

## 2024-06-11 RX ORDER — ACETAMINOPHEN 160 MG
2000 TABLET,DISINTEGRATING ORAL DAILY
COMMUNITY

## 2024-06-11 NOTE — PROGRESS NOTES
The ABCs of the Annual Wellness Visit  Subsequent Medicare Wellness Visit    Subjective      Stephani Etienne is a 83 y.o. female who presents for a Subsequent Medicare Wellness Visit.    Patient presents today for Medicare wellness visit.  She is reportedly up-to-date on her shingles vaccine.  She is due for an RSV vaccine, COVID booster, pneumonia vaccine.  She declines all vaccines.  She is up-to-date on her DEXA scan, completed .  She no longer requires colonoscopies due to age.  She requests mammograms yearly and she is up-to-date on this, last completed November 2023 which was normal.  She is up-to-date on her eye and dental exams.    The following portions of the patient's history were reviewed and   updated as appropriate: allergies, current medications, past family history, past medical history, past social history, past surgical history, and problem list.    Compared to one year ago, the patient feels her physical   health is better.    Compared to one year ago, the patient feels her mental   health is better.    Recent Hospitalizations:  She was not admitted to the hospital during the last year.       Current Medical Providers:  Patient Care Team:  Toño Chiang MD as PCP - General (Family Medicine)  Raul Adams MD as Consulting Physician (Radiation Oncology)  Alvaro Mackey DO (Obstetrics and Gynecology)    Outpatient Medications Prior to Visit   Medication Sig Dispense Refill    acetaminophen (TYLENOL) 500 MG tablet Take 1 tablet by mouth Every 6 (Six) Hours As Needed.      amLODIPine (NORVASC) 5 MG tablet Take 1 tablet by mouth Every Night. 90 tablet 3    ammonium lactate (AMLACTIN) 12 % cream Apply  topically to the appropriate area as directed 2 (Two) Times a Day. ammonium lactate 12 % topical cream 385 g 3    aspirin 81 MG chewable tablet Chew 1 tablet Daily.      Cholecalciferol (Vitamin D3) 50 MCG (2000 UT) capsule Take 1 capsule by mouth Daily.      DULoxetine (CYMBALTA)  "20 MG capsule Take 1 capsule by mouth Daily. 90 capsule 3    ezetimibe (ZETIA) 10 MG tablet Take 1 tablet by mouth Every Night. 90 tablet 3    fluticasone (FLONASE) 50 MCG/ACT nasal spray 2 sprays into the nostril(s) as directed by provider Daily. 48 g 3    multivitamin with minerals tablet tablet Take 1 tablet by mouth Daily.      polyethyl glycol-propyl glycol (SYSTANE) 0.4-0.3 % solution ophthalmic solution (artificial tears) Administer 1 drop to both eyes Daily As Needed.      valsartan-hydrochlorothiazide (DIOVAN-HCT) 320-12.5 MG per tablet Take 1 tablet by mouth Daily. 90 tablet 3    Cholecalciferol 50 MCG (2000 UT) tablet Take  by mouth Daily.       No facility-administered medications prior to visit.     No opioid medication identified on active medication list. I have reviewed chart for other potential  high risk medication/s and harmful drug interactions in the elderly.        Aspirin is on active medication list. Aspirin use is indicated based on review of current medical condition/s. Pros and cons of this therapy have been discussed today. Benefits of this medication outweigh potential harm.  Patient has been encouraged to continue taking this medication.  .      Patient Active Problem List   Diagnosis    Essential hypertension    Mixed hyperlipidemia    Stroke    Vitamin D deficiency    Fibromyalgia    Chronic venous insufficiency    Morbid obesity     Advance Care Planning   Advance Care Planning     Advance Directive is on file.  ACP discussion was held with the patient during this visit. Patient has an advance directive in EMR which is still valid.      Objective    Vitals:    06/11/24 0939   BP: 148/83   Pulse: 69   Temp: 98 °F (36.7 °C)   SpO2: 96%  Comment: room air   Weight: 87.2 kg (192 lb 3.2 oz)   Height: 160 cm (62.99\")     Estimated body mass index is 34.06 kg/m² as calculated from the following:    Height as of this encounter: 160 cm (62.99\").    Weight as of this encounter: 87.2 kg (192 lb " 3.2 oz).      Does the patient have evidence of cognitive impairment?   No          HEALTH RISK ASSESSMENT    Smoking Status:  Social History     Tobacco Use   Smoking Status Never   Smokeless Tobacco Never     Alcohol Consumption:  Social History     Substance and Sexual Activity   Alcohol Use Never     Fall Risk Screen:    [place] Fall Risk Assessment was completed, and patient is at LOW risk for falls.Assessment completed on:2024    Depression Screenin/11/2024     9:46 AM   PHQ-2/PHQ-9 Depression Screening   Little Interest or Pleasure in Doing Things 0-->not at all   Feeling Down, Depressed or Hopeless 0-->not at all   PHQ-9: Brief Depression Severity Measure Score 0       Health Habits and Functional and Cognitive Screenin/11/2024     9:46 AM   Functional & Cognitive Status   Do you have difficulty preparing food and eating? No   Do you have difficulty bathing yourself, getting dressed or grooming yourself? No   Do you have difficulty using the toilet? No   Do you have difficulty moving around from place to place? Yes   Do you have trouble with steps or getting out of a bed or a chair? Yes   Current Diet Frequent Junk Food   Dental Exam Up to date   Eye Exam Up to date   Exercise (times per week) 2 times per week   Current Exercises Include Yard Work   Do you need help using the phone?  No   Are you deaf or do you have serious difficulty hearing?  Yes   Do you need help to go to places out of walking distance? Yes   Do you need help shopping? No   Do you need help preparing meals?  No   Do you need help with housework?  No   Do you need help with laundry? No   Do you need help taking your medications? No   Do you need help managing money? No   Do you ever drive or ride in a car without wearing a seat belt? No   Have you felt unusual stress, anger or loneliness in the last month? No   Who do you live with? Child   If you need help, do you have trouble finding someone available to you? No    Have you been bothered in the last four weeks by sexual problems? No   Do you have difficulty concentrating, remembering or making decisions? No       Age-appropriate Screening Schedule:  Refer to the list below for future screening recommendations based on patient's age, sex and/or medical conditions. Orders for these recommended tests are listed in the plan section. The patient has been provided with a written plan.    Health Maintenance   Topic Date Due    ZOSTER VACCINE (1 of 2) Never done    LIPID PANEL  08/30/2023    RSV Vaccine - Adults (1 - 1-dose 60+ series) 09/11/2024 (Originally 9/11/2001)    COVID-19 Vaccine (4 - 2023-24 season) 12/14/2024 (Originally 9/1/2023)    Pneumococcal Vaccine 65+ (1 of 1 - PCV) 12/14/2024 (Originally 9/11/2006)    INFLUENZA VACCINE  08/01/2024    DXA SCAN  10/25/2024    TDAP/TD VACCINES (2 - Td or Tdap) 11/04/2024    BMI FOLLOWUP  12/14/2024    ANNUAL WELLNESS VISIT  06/11/2025                  CMS Preventative Services Quick Reference  Risk Factors Identified During Encounter:    Hearing Problem:  Continue with hearing aids, follow up with audiology as needed.   Immunizations Discussed/Encouraged: Prevnar 20 (Pneumococcal 20-valent conjugate), Shingrix, COVID19, and RSV (Respiratory Syncytial Virus)    The above risks/problems have been discussed with the patient.  Pertinent information has been shared with the patient in the After Visit Summary.    Diagnoses and all orders for this visit:    1. Medicare annual wellness visit, subsequent (Primary)    2. Immunization due  Comments:  Reportedly UTD on Shingrix, declines any additional vaccines.    3. Encounter for screening for depression  Comments:  PHQ2 0, denies SI/HI.    4. Decreased hearing of both ears  Comments:  Has hearing aids      Medicare annual wellness visit completed today. Recommended health maintenance topics were discussed and reviewed with patient. Patient is a low risk for falls, falls prevention discussed.  Recommend staying UTD on dental and eye exams. Recommend healthy diet, routine exercise. She will be due for DEXA and mammogram this year,recalls have been placed per PCP. She is due for a visit with her PCP for management of chronic conditions and medication refills, appointment scheduled.     Follow Up:   Next Medicare Wellness visit to be scheduled in 1 year.      An After Visit Summary and PPPS were made available to the patient.

## 2024-06-14 ENCOUNTER — TELEMEDICINE (OUTPATIENT)
Dept: FAMILY MEDICINE CLINIC | Age: 84
End: 2024-06-14
Payer: MEDICARE

## 2024-06-14 DIAGNOSIS — E78.2 MIXED HYPERLIPIDEMIA: ICD-10-CM

## 2024-06-14 DIAGNOSIS — I87.2 CHRONIC VENOUS INSUFFICIENCY: ICD-10-CM

## 2024-06-14 DIAGNOSIS — I10 ESSENTIAL HYPERTENSION: ICD-10-CM

## 2024-06-14 DIAGNOSIS — J30.1 SEASONAL ALLERGIC RHINITIS DUE TO POLLEN: ICD-10-CM

## 2024-06-14 DIAGNOSIS — M79.7 FIBROMYALGIA: ICD-10-CM

## 2024-06-14 PROCEDURE — 1159F MED LIST DOCD IN RCRD: CPT | Performed by: FAMILY MEDICINE

## 2024-06-14 PROCEDURE — 99214 OFFICE O/P EST MOD 30 MIN: CPT | Performed by: FAMILY MEDICINE

## 2024-06-14 PROCEDURE — 1160F RVW MEDS BY RX/DR IN RCRD: CPT | Performed by: FAMILY MEDICINE

## 2024-06-14 PROCEDURE — G2211 COMPLEX E/M VISIT ADD ON: HCPCS | Performed by: FAMILY MEDICINE

## 2024-06-14 RX ORDER — VALSARTAN AND HYDROCHLOROTHIAZIDE 320; 12.5 MG/1; MG/1
1 TABLET, FILM COATED ORAL DAILY
Qty: 90 TABLET | Refills: 3 | Status: SHIPPED | OUTPATIENT
Start: 2024-06-14

## 2024-06-14 RX ORDER — AMMONIUM LACTATE 12 G/100G
CREAM TOPICAL 2 TIMES DAILY
Qty: 385 G | Refills: 3 | Status: SHIPPED | OUTPATIENT
Start: 2024-06-14

## 2024-06-14 RX ORDER — AMLODIPINE BESYLATE 5 MG/1
5 TABLET ORAL NIGHTLY
Qty: 90 TABLET | Refills: 3 | Status: SHIPPED | OUTPATIENT
Start: 2024-06-14

## 2024-06-14 RX ORDER — FLUTICASONE PROPIONATE 50 MCG
2 SPRAY, SUSPENSION (ML) NASAL DAILY
Qty: 48 G | Refills: 3 | Status: SHIPPED | OUTPATIENT
Start: 2024-06-14

## 2024-06-14 RX ORDER — DULOXETIN HYDROCHLORIDE 20 MG/1
20 CAPSULE, DELAYED RELEASE ORAL DAILY
Qty: 90 CAPSULE | Refills: 3 | Status: SHIPPED | OUTPATIENT
Start: 2024-06-14

## 2024-06-14 RX ORDER — EZETIMIBE 10 MG/1
10 TABLET ORAL NIGHTLY
Qty: 90 TABLET | Refills: 3 | Status: SHIPPED | OUTPATIENT
Start: 2024-06-14

## 2024-06-14 NOTE — PROGRESS NOTES
Stephani Etienne presents to [facility].    Chief Complaint:  Blood pressure, cholesterol    TELEHEALTH VISIT:   - Stephani consented to this telehealth visit.   - Persons on the call include:  patient - Stephani, her daughter - Dr. Андрей Lai   - The patient is at home.  I am at the office.   - This visit is being conducted over Doximity with audio and video capability.   - This visit is being done remotely for patient convenience and compliance.    Subjective   History of Present Illness:  Stephani is being seen today for follow-up on her care.  She has hypertension and remains on amlodipine and valsartan/HCTZ for this.  Her blood pressure was mildly elevated on her most recent visit here.  She does not frequently check it at home.  She says she is doing well with the medications.    She also remains on ezetimibe for cholesterol.  She is not able to take statins due to intolerance.    Stephani also has fibromyalgia and has been taking duloxetine.  She is tolerating that well and states that she is feeling well overall.    Review of Systems:  Review of Systems   Constitutional:  Negative for chills and fever.   Respiratory:  Negative for cough and shortness of breath.    Cardiovascular:  Negative for chest pain and palpitations.   Gastrointestinal:  Negative for abdominal pain, nausea and vomiting.      Objective   Medical History:  Past Medical History:    Endometrial cancer    Essential hypertension    Fibromyalgia    History of DVT (deep vein thrombosis)    Mixed hyperlipidemia    Stroke    x 2    Vitamin D deficiency     Past Surgical History:    BREAST BIOPSY    Benign    CATARACT EXTRACTION    POSTPARTUM TUBAL LIGATION    SKIN CANCER EXCISION    Nose    TOTAL HIP ARTHROPLASTY    TOTAL KNEE ARTHROPLASTY    TOTAL LAPAROSCOPIC HYSTERECTOMY AND SALPINGECTOMY    VARICOSE VEIN SURGERY      Family History   Problem Relation Age of Onset    No Known Problems Mother     Stroke Father      "Lung cancer Sister     COPD Brother     Coronary artery disease Brother      Social History     Tobacco Use    Smoking status: Never    Smokeless tobacco: Never   Substance Use Topics    Alcohol use: Never       Health Maintenance Due   Topic Date Due    ZOSTER VACCINE (1 of 2) Never done    LIPID PANEL  08/30/2023        Immunization History   Administered Date(s) Administered    COVID-19 (PFIZER) Purple Cap Monovalent 02/24/2021, 03/17/2021, 05/31/2022    FLUAD TRI 65YR+ 11/08/2016, 10/09/2018    Fluzone High Dose =>65 Years ([employer] ONLY) 12/20/2017, 11/06/2019, 12/11/2020    Fluzone High-Dose 65+yrs 12/20/2017, 11/06/2019, 12/11/2020    Tdap 11/04/2014       Allergies   Allergen Reactions    Atorvastatin Myalgia    Elastic Bandages & [Zinc] Other (See Comments)     \"Im allergic to any kind of elastic\"    Morphine Other (See Comments)    Oxycodone-Acetaminophen GI Intolerance    Pravastatin Other (See Comments)     \"just makes me feel bad\"    Valproic Acid Unknown - Low Severity    Iodine Rash    Latex Rash     +ALLERGY TESTING , RX TO ALL ELASTIC PRODUCTS    Mercury Rash    Na Hyalur & Na Chond-Na Hyalur Rash    Red Dye Rash     RX FROM ANYTHING W/RED COLORING PER PT        Medications:  Current Outpatient Medications on File Prior to Visit   Medication Sig    acetaminophen (TYLENOL) 500 MG tablet Take 1 tablet by mouth Every 6 (Six) Hours As Needed.    aspirin 81 MG chewable tablet Chew 1 tablet Daily.    Cholecalciferol (Vitamin D3) 50 MCG (2000 UT) capsule Take 1 capsule by mouth Daily.    multivitamin with minerals tablet tablet Take 1 tablet by mouth Daily.    polyethyl glycol-propyl glycol (SYSTANE) 0.4-0.3 % solution ophthalmic solution (artificial tears) Administer 1 drop to both eyes Daily As Needed.    [DISCONTINUED] amLODIPine (NORVASC) 5 MG tablet Take 1 tablet by mouth Every Night.    [DISCONTINUED] ammonium lactate (AMLACTIN) 12 % cream Apply  topically to the appropriate area as directed 2 (Two) " Times a Day. ammonium lactate 12 % topical cream    [DISCONTINUED] DULoxetine (CYMBALTA) 20 MG capsule Take 1 capsule by mouth Daily.    [DISCONTINUED] ezetimibe (ZETIA) 10 MG tablet Take 1 tablet by mouth Every Night.    [DISCONTINUED] fluticasone (FLONASE) 50 MCG/ACT nasal spray 2 sprays into the nostril(s) as directed by provider Daily.    [DISCONTINUED] valsartan-hydrochlorothiazide (DIOVAN-HCT) 320-12.5 MG per tablet Take 1 tablet by mouth Daily.     No current facility-administered medications on file prior to visit.     Vital Signs:   There were no vitals taken for this visit.      Physical Exam:  Physical Exam  Vitals reviewed.   Constitutional:       General: She is not in acute distress.     Appearance: She is not ill-appearing.   Eyes:      Pupils: Pupils are equal, round, and reactive to light.   Pulmonary:      Effort: Pulmonary effort is normal.   Musculoskeletal:      Right lower leg: No edema.      Left lower leg: Edema (mild) present.   Skin:     Findings: No lesion or rash.   Neurological:      General: No focal deficit present.      Mental Status: She is alert.      Cranial Nerves: No cranial nerve deficit.       Result Review   The following data was reviewed by Toño Chiang MD on 06/14/2024.  Lab Results   Component Value Date    WBC 8.51 03/16/2023    HGB 11.3 (L) 03/16/2023    HCT 35.6 (L) 03/16/2023    MCV 97.5 03/16/2023     03/16/2023     Lab Results   Component Value Date    GLUCOSE 94 08/30/2022    BUN 9 (L) 03/16/2023    CREATININE 0.56 03/16/2023     03/16/2023    K 3.5 03/16/2023     03/16/2023    CO2 27 03/16/2023    CALCIUM 8.5 03/16/2023    PROTEINTOT 7.2 03/02/2023    ALBUMIN 4.2 03/02/2023    ALT 21 03/02/2023    AST 17 03/02/2023    ALKPHOS 46 03/02/2023    BILITOT 0.3 03/02/2023    EGFR 88.5 08/30/2022    GLOB 3.0 03/02/2023    AGRATIO 1.8 08/30/2022    BCR 16.1 03/16/2023    ANIONGAP 5 03/16/2023      Lab Results   Component Value Date    CHOL 201  (H) 08/30/2022    TRIG 171 (H) 08/30/2022    HDL 47 08/30/2022     (H) 08/30/2022     Lab Results   Component Value Date    TSH 1.840 08/30/2022     Lab Results   Component Value Date    HGBA1C 5.9 (H) 03/02/2023       CORTISOL (10/26/2023 14:20)  ACTH (10/26/2023 14:20)  COMPREHENSIVE METABOLIC PANEL (10/26/2023 14:20)     Assessment and Plan:   Today, we have reviewed her care.  Overall, Stephani seems well.  Her blood pressures have been up and down a little bit, but they are reasonable.  For now, we will refill her medications.  I did review labs that were done through [place] in the fall, and there were no concerns at that time.  Tentative follow-up will be in 6 months or as needed.    Diagnoses and all orders for this visit:    1. Essential hypertension  -     amLODIPine (NORVASC) 5 MG tablet; Take 1 tablet by mouth Every Night.  Dispense: 90 tablet; Refill: 3  -     valsartan-hydrochlorothiazide (DIOVAN-HCT) 320-12.5 MG per tablet; Take 1 tablet by mouth Daily.  Dispense: 90 tablet; Refill: 3    2. Mixed hyperlipidemia  -     ezetimibe (ZETIA) 10 MG tablet; Take 1 tablet by mouth Every Night.  Dispense: 90 tablet; Refill: 3    3. Fibromyalgia  -     DULoxetine (CYMBALTA) 20 MG capsule; Take 1 capsule by mouth Daily.  Dispense: 90 capsule; Refill: 3    4. Chronic venous insufficiency  -     ammonium lactate (AMLACTIN) 12 % cream; Apply  topically to the appropriate area as directed 2 (Two) Times a Day. ammonium lactate 12 % topical cream  Dispense: 385 g; Refill: 3    5. Seasonal allergic rhinitis due to pollen  -     fluticasone (FLONASE) 50 MCG/ACT nasal spray; 2 sprays into the nostril(s) as directed by provider Daily.  Dispense: 48 g; Refill: 3    Follow Up  Return if symptoms worsen or fail to improve.  Patient was given instructions and counseling regarding her condition or for health maintenance advice. Please see specific information pulled into the AVS if appropriate.

## 2024-07-12 DIAGNOSIS — M79.7 FIBROMYALGIA: ICD-10-CM

## 2024-07-12 RX ORDER — DULOXETIN HYDROCHLORIDE 20 MG/1
20 CAPSULE, DELAYED RELEASE ORAL DAILY
Qty: 90 CAPSULE | Refills: 3 | OUTPATIENT
Start: 2024-07-12

## 2024-09-14 ENCOUNTER — TELEPHONE (OUTPATIENT)
Dept: FAMILY MEDICINE CLINIC | Age: 84
End: 2024-09-14
Payer: MEDICARE

## 2024-09-14 NOTE — TELEPHONE ENCOUNTER
Please let her daughter, Jolele, know that we did report concerns about Stephani is driving to the [facility] in late December.  She again expressed concerns about this.  It is unclear whether she was contacted regarding this or not.  If Joelle would like us to resend this, please see the document dated 12/28/2023 and fill it in with the same information.  Bring to me for signature.  Thanks.

## 2024-09-16 NOTE — TELEPHONE ENCOUNTER
Pts daughter would like it resent and make sure note is attached regarding pt refusing to take the driving test recommended. Printed and placed in your inbox

## 2024-10-03 ENCOUNTER — TELEPHONE (OUTPATIENT)
Dept: FAMILY MEDICINE CLINIC | Age: 84
End: 2024-10-03
Payer: MEDICARE

## 2024-10-03 NOTE — TELEPHONE ENCOUNTER
Caller: [employer]    Relationship: Other    Best call back number: [phone]    What form or medical record are you requesting: ORDER FORM  FOR [employer]     Who is requesting this form or medical record from you: [employer]      Additional notes: CALLER WANTS TO CONFIRM FORM WAS RECEIVED   FAXED ON 09/30/2024 AND 10/02/2024  PLEASE CONTACT AND ADVISE

## 2024-10-14 DIAGNOSIS — E78.2 MIXED HYPERLIPIDEMIA: ICD-10-CM

## 2024-10-14 RX ORDER — EZETIMIBE 10 MG/1
10 TABLET ORAL NIGHTLY
Qty: 90 TABLET | Refills: 3 | OUTPATIENT
Start: 2024-10-14

## 2024-10-31 ENCOUNTER — TELEPHONE (OUTPATIENT)
Dept: FAMILY MEDICINE CLINIC | Age: 84
End: 2024-10-31
Payer: MEDICARE

## 2024-10-31 DIAGNOSIS — Z78.0 POSTMENOPAUSAL: Primary | ICD-10-CM

## 2024-11-07 ENCOUNTER — TELEPHONE (OUTPATIENT)
Dept: FAMILY MEDICINE CLINIC | Age: 84
End: 2024-11-07
Payer: MEDICARE

## 2024-11-07 DIAGNOSIS — Z12.31 ENCOUNTER FOR SCREENING MAMMOGRAM FOR MALIGNANT NEOPLASM OF BREAST: Primary | ICD-10-CM

## 2024-12-03 ENCOUNTER — OFFICE VISIT (OUTPATIENT)
Dept: FAMILY MEDICINE CLINIC | Age: 84
End: 2024-12-03
Payer: MEDICARE

## 2024-12-03 ENCOUNTER — LAB (OUTPATIENT)
Dept: LAB | Facility: HOSPITAL | Age: 84
End: 2024-12-03
Payer: MEDICARE

## 2024-12-03 VITALS
HEIGHT: 63 IN | DIASTOLIC BLOOD PRESSURE: 56 MMHG | OXYGEN SATURATION: 100 % | WEIGHT: 192.8 LBS | BODY MASS INDEX: 34.16 KG/M2 | SYSTOLIC BLOOD PRESSURE: 128 MMHG | HEART RATE: 67 BPM | TEMPERATURE: 98.3 F

## 2024-12-03 DIAGNOSIS — E55.9 VITAMIN D DEFICIENCY: ICD-10-CM

## 2024-12-03 DIAGNOSIS — M79.7 FIBROMYALGIA: ICD-10-CM

## 2024-12-03 DIAGNOSIS — Z79.899 OTHER LONG TERM (CURRENT) DRUG THERAPY: ICD-10-CM

## 2024-12-03 DIAGNOSIS — E78.2 MIXED HYPERLIPIDEMIA: ICD-10-CM

## 2024-12-03 DIAGNOSIS — R73.9 HYPERGLYCEMIA: ICD-10-CM

## 2024-12-03 DIAGNOSIS — I10 ESSENTIAL HYPERTENSION: ICD-10-CM

## 2024-12-03 DIAGNOSIS — I10 ESSENTIAL HYPERTENSION: Primary | ICD-10-CM

## 2024-12-03 DIAGNOSIS — I87.2 CHRONIC VENOUS INSUFFICIENCY: ICD-10-CM

## 2024-12-03 DIAGNOSIS — R41.3 MEMORY LOSS: ICD-10-CM

## 2024-12-03 LAB
25(OH)D3 SERPL-MCNC: 48.1 NG/ML (ref 30–100)
ALBUMIN SERPL-MCNC: 4 G/DL (ref 3.5–5.2)
ALBUMIN/GLOB SERPL: 1.4 G/DL
ALP SERPL-CCNC: 54 U/L (ref 39–117)
ALT SERPL W P-5'-P-CCNC: 17 U/L (ref 1–33)
ANION GAP SERPL CALCULATED.3IONS-SCNC: 8 MMOL/L (ref 5–15)
AST SERPL-CCNC: 16 U/L (ref 1–32)
BILIRUB SERPL-MCNC: 0.3 MG/DL (ref 0–1.2)
BUN SERPL-MCNC: 20 MG/DL (ref 8–23)
BUN/CREAT SERPL: 33.3 (ref 7–25)
CALCIUM SPEC-SCNC: 10 MG/DL (ref 8.6–10.5)
CHLORIDE SERPL-SCNC: 100 MMOL/L (ref 98–107)
CHOLEST SERPL-MCNC: 196 MG/DL (ref 0–200)
CO2 SERPL-SCNC: 30 MMOL/L (ref 22–29)
CREAT SERPL-MCNC: 0.6 MG/DL (ref 0.57–1)
DEPRECATED RDW RBC AUTO: 46.4 FL (ref 37–54)
EGFRCR SERPLBLD CKD-EPI 2021: 89.2 ML/MIN/1.73
ERYTHROCYTE [DISTWIDTH] IN BLOOD BY AUTOMATED COUNT: 12.6 % (ref 12.3–15.4)
FOLATE SERPL-MCNC: >20 NG/ML (ref 4.78–24.2)
GLOBULIN UR ELPH-MCNC: 2.8 GM/DL
GLUCOSE SERPL-MCNC: 101 MG/DL (ref 65–99)
HBA1C MFR BLD: 5.8 % (ref 4.8–5.6)
HCT VFR BLD AUTO: 42.2 % (ref 34–46.6)
HDLC SERPL-MCNC: 50 MG/DL (ref 40–60)
HGB BLD-MCNC: 13.3 G/DL (ref 12–15.9)
LDLC SERPL CALC-MCNC: 121 MG/DL (ref 0–100)
LDLC/HDLC SERPL: 2.35 {RATIO}
MCH RBC QN AUTO: 31.1 PG (ref 26.6–33)
MCHC RBC AUTO-ENTMCNC: 31.5 G/DL (ref 31.5–35.7)
MCV RBC AUTO: 98.8 FL (ref 79–97)
PLATELET # BLD AUTO: 233 10*3/MM3 (ref 140–450)
PMV BLD AUTO: 9.6 FL (ref 6–12)
POTASSIUM SERPL-SCNC: 4.1 MMOL/L (ref 3.5–5.2)
PROT SERPL-MCNC: 6.8 G/DL (ref 6–8.5)
RBC # BLD AUTO: 4.27 10*6/MM3 (ref 3.77–5.28)
SODIUM SERPL-SCNC: 138 MMOL/L (ref 136–145)
TRIGL SERPL-MCNC: 142 MG/DL (ref 0–150)
TSH SERPL DL<=0.05 MIU/L-ACNC: 2.26 UIU/ML (ref 0.27–4.2)
VIT B12 BLD-MCNC: 580 PG/ML (ref 211–946)
VLDLC SERPL-MCNC: 25 MG/DL (ref 5–40)
WBC NRBC COR # BLD AUTO: 6.41 10*3/MM3 (ref 3.4–10.8)

## 2024-12-03 PROCEDURE — 3074F SYST BP LT 130 MM HG: CPT | Performed by: FAMILY MEDICINE

## 2024-12-03 PROCEDURE — 1160F RVW MEDS BY RX/DR IN RCRD: CPT | Performed by: FAMILY MEDICINE

## 2024-12-03 PROCEDURE — 80061 LIPID PANEL: CPT

## 2024-12-03 PROCEDURE — 85027 COMPLETE CBC AUTOMATED: CPT

## 2024-12-03 PROCEDURE — G2211 COMPLEX E/M VISIT ADD ON: HCPCS | Performed by: FAMILY MEDICINE

## 2024-12-03 PROCEDURE — 84443 ASSAY THYROID STIM HORMONE: CPT

## 2024-12-03 PROCEDURE — 99214 OFFICE O/P EST MOD 30 MIN: CPT | Performed by: FAMILY MEDICINE

## 2024-12-03 PROCEDURE — 36415 COLL VENOUS BLD VENIPUNCTURE: CPT

## 2024-12-03 PROCEDURE — 82306 VITAMIN D 25 HYDROXY: CPT

## 2024-12-03 PROCEDURE — 80053 COMPREHEN METABOLIC PANEL: CPT

## 2024-12-03 PROCEDURE — 83036 HEMOGLOBIN GLYCOSYLATED A1C: CPT

## 2024-12-03 PROCEDURE — 1159F MED LIST DOCD IN RCRD: CPT | Performed by: FAMILY MEDICINE

## 2024-12-03 PROCEDURE — 82607 VITAMIN B-12: CPT

## 2024-12-03 PROCEDURE — 82746 ASSAY OF FOLIC ACID SERUM: CPT

## 2024-12-03 PROCEDURE — 3078F DIAST BP <80 MM HG: CPT | Performed by: FAMILY MEDICINE

## 2024-12-03 RX ORDER — EZETIMIBE 10 MG/1
10 TABLET ORAL NIGHTLY
Qty: 90 TABLET | Refills: 3 | Status: SHIPPED | OUTPATIENT
Start: 2024-12-03

## 2024-12-03 RX ORDER — DULOXETIN HYDROCHLORIDE 20 MG/1
20 CAPSULE, DELAYED RELEASE ORAL DAILY
Qty: 90 CAPSULE | Refills: 3 | Status: SHIPPED | OUTPATIENT
Start: 2024-12-03

## 2024-12-03 RX ORDER — AMLODIPINE BESYLATE 5 MG/1
5 TABLET ORAL NIGHTLY
Qty: 90 TABLET | Refills: 3 | Status: SHIPPED | OUTPATIENT
Start: 2024-12-03

## 2024-12-03 RX ORDER — SENNOSIDES 8.6 MG
650 CAPSULE ORAL EVERY 8 HOURS PRN
COMMUNITY

## 2024-12-03 RX ORDER — VALSARTAN AND HYDROCHLOROTHIAZIDE 320; 12.5 MG/1; MG/1
1 TABLET, FILM COATED ORAL DAILY
Qty: 90 TABLET | Refills: 3 | Status: SHIPPED | OUTPATIENT
Start: 2024-12-03

## 2024-12-03 NOTE — PROGRESS NOTES
Stephani Etienne presents to [facility].    Chief Complaint:  Blood pressure, cholesterol    Subjective   History of Present Illness:  Stephani is being seen today for follow-up on her care.  She has hypertension and hyperlipidemia for which she remains on treatments as noted.  Her blood pressure is in a good range here.  She does not routinely check her blood pressure at home.  She seems to tolerate her medications fairly well.    In addition to the above, her daughter expresses concerns about ongoing memory difficulty.  Please see her most recent visits in this regard.  Stephani does bring in paperwork from the state of [place] regarding her 's license.  She is apparently needing a medical clearance in order to retain her license.    Review of Systems:  Review of Systems   Constitutional:  Negative for chills and fever.   Respiratory:  Negative for cough and shortness of breath.    Cardiovascular:  Negative for chest pain and palpitations.   Gastrointestinal:  Negative for abdominal pain, nausea and vomiting.      Objective   Medical History:  Past Medical History:    Endometrial cancer    Essential hypertension    Fibromyalgia    History of DVT (deep vein thrombosis)    Mixed hyperlipidemia    Stroke    x 2    Vitamin D deficiency     Past Surgical History:    BREAST BIOPSY    Benign    CATARACT EXTRACTION    POSTPARTUM TUBAL LIGATION    SKIN CANCER EXCISION    Nose    TOTAL HIP ARTHROPLASTY    TOTAL KNEE ARTHROPLASTY    TOTAL LAPAROSCOPIC HYSTERECTOMY AND SALPINGECTOMY    VARICOSE VEIN SURGERY      Family History   Problem Relation Age of Onset    No Known Problems Mother     Stroke Father     Lung cancer Sister     COPD Brother     Coronary artery disease Brother      Social History     Tobacco Use    Smoking status: Never    Smokeless tobacco: Never   Substance Use Topics    Alcohol use: Never       Health Maintenance Due   Topic Date Due    ZOSTER VACCINE (1 of 2) Never  "done    RSV Vaccine - Adults (1 - 1-dose 75+ series) Never done    LIPID PANEL  08/30/2023    DXA SCAN  10/25/2024    TDAP/TD VACCINES (2 - Td or Tdap) 11/04/2024        Immunization History   Administered Date(s) Administered    COVID-19 (PFIZER) Purple Cap Monovalent 02/24/2021, 03/17/2021, 05/31/2022    FLUAD TRI 65YR+ 11/08/2016, 10/09/2018    Fluzone High-Dose 65+YRS 12/20/2017, 11/06/2019, 12/11/2020    Fluzone High-Dose 65+yrs 12/20/2017, 11/06/2019, 12/11/2020    Tdap 11/04/2014       Allergies   Allergen Reactions    Atorvastatin Myalgia    Elastic Bandages & [Zinc] Other (See Comments)     \"Im allergic to any kind of elastic\"    Morphine Other (See Comments)    Oxycodone-Acetaminophen GI Intolerance    Pravastatin Other (See Comments)     \"just makes me feel bad\"    Valproic Acid Unknown - Low Severity    Iodine Rash    Latex Rash     +ALLERGY TESTING , RX TO ALL ELASTIC PRODUCTS    Mercury Rash    Na Hyalur & Na Chond-Na Hyalur Rash    Red Dye #40 (Allura Red) Rash     RX FROM ANYTHING W/RED COLORING PER PT        Medications:    Current Outpatient Medications:     acetaminophen (TYLENOL) 650 MG 8 hr tablet, Take 1 tablet by mouth Every 8 (Eight) Hours As Needed for Mild Pain., Disp: , Rfl:     amLODIPine (NORVASC) 5 MG tablet, Take 1 tablet by mouth Every Night., Disp: 90 tablet, Rfl: 3    ammonium lactate (AMLACTIN) 12 % cream, Apply  topically to the appropriate area as directed 2 (Two) Times a Day. ammonium lactate 12 % topical cream, Disp: 385 g, Rfl: 3    aspirin 81 MG chewable tablet, Chew 1 tablet Daily., Disp: , Rfl:     Cholecalciferol (Vitamin D3) 50 MCG (2000 UT) capsule, Take 1 capsule by mouth Daily., Disp: , Rfl:     DULoxetine (CYMBALTA) 20 MG capsule, Take 1 capsule by mouth Daily., Disp: 90 capsule, Rfl: 3    ezetimibe (ZETIA) 10 MG tablet, Take 1 tablet by mouth Every Night., Disp: 90 tablet, Rfl: 3    fluticasone (FLONASE) 50 MCG/ACT nasal spray, 2 sprays into the nostril(s) as " "directed by provider Daily., Disp: 48 g, Rfl: 3    multivitamin with minerals tablet tablet, Take 1 tablet by mouth Daily., Disp: , Rfl:     polyethyl glycol-propyl glycol (SYSTANE) 0.4-0.3 % solution ophthalmic solution (artificial tears), Administer 1 drop to both eyes Daily As Needed., Disp: , Rfl:     valsartan-hydrochlorothiazide (DIOVAN-HCT) 320-12.5 MG per tablet, Take 1 tablet by mouth Daily., Disp: 90 tablet, Rfl: 3    Vital Signs:   /56 (BP Location: Left arm, Patient Position: Sitting)   Pulse 67   Temp 98.3 °F (36.8 °C) (Oral)   Ht 160 cm (62.99\")   Wt 87.5 kg (192 lb 12.8 oz)   SpO2 100%   BMI 34.16 kg/m²       Physical Exam:  Physical Exam  Vitals reviewed.   Constitutional:       General: She is not in acute distress.     Appearance: She is not ill-appearing.   HENT:      Right Ear: Tympanic membrane and ear canal normal.      Left Ear: Tympanic membrane and ear canal normal.   Eyes:      Extraocular Movements: Extraocular movements intact.      Pupils: Pupils are equal, round, and reactive to light.   Neck:      Comments: No thyromegaly  Cardiovascular:      Rate and Rhythm: Normal rate and regular rhythm.   Pulmonary:      Effort: Pulmonary effort is normal.      Breath sounds: Normal breath sounds.   Abdominal:      General: There is no distension.      Palpations: Abdomen is soft.      Tenderness: There is no abdominal tenderness.   Musculoskeletal:      Cervical back: Neck supple.      Right lower leg: Edema present.      Left lower leg: Edema present.   Lymphadenopathy:      Cervical: No cervical adenopathy.   Skin:     Findings: No lesion or rash.   Neurological:      Mental Status: She is alert.     Result Review   The following data was reviewed by Toño Chiang MD on 12/03/2024.  Lab Results   Component Value Date    WBC 8.51 03/16/2023    HGB 11.3 (L) 03/16/2023    HCT 35.6 (L) 03/16/2023    MCV 97.5 03/16/2023     03/16/2023     Lab Results   Component Value " Date    GLUCOSE 94 08/30/2022    BUN 9 (L) 03/16/2023    CREATININE 0.56 03/16/2023     03/16/2023    K 3.5 03/16/2023     03/16/2023    CALCIUM 8.5 03/16/2023    PROTEINTOT 7.2 03/02/2023    ALBUMIN 4.2 03/02/2023    ALT 21 03/02/2023    AST 17 03/02/2023    ALKPHOS 46 03/02/2023    BILITOT 0.3 03/02/2023    GLOB 3.0 03/02/2023    AGRATIO 1.8 08/30/2022    BCR 16.1 03/16/2023    ANIONGAP 5 03/16/2023    EGFR 88.5 08/30/2022     Lab Results   Component Value Date    CHOL 201 (H) 08/30/2022    TRIG 171 (H) 08/30/2022    HDL 47 08/30/2022     (H) 08/30/2022     Lab Results   Component Value Date    TSH 1.840 08/30/2022     Lab Results   Component Value Date    HGBA1C 5.9 (H) 03/02/2023          Assessment and Plan:   Today, we have reviewed her care.  Overall, Stephani seems well.  Her physical exam is relatively stable overall.  She continues to have moderate edema in her lower extremities, but it does not seem to have worsened.  For the near term, we will refill her medications and update labs as noted below.  Regarding her memory, she likely has some degree of underlying dementia.  Because of the difficulty that she has had with driving in the past, I am not able to clear her for this.  We discussed whether to consider medication for her memory, but she does not wish to move forward with that currently.  She declines vaccines today.  Tentative follow-up with me will be again in about 6 months, sooner if needed.    Diagnoses and all orders for this visit:    1. Essential hypertension (Primary)  -     Comprehensive Metabolic Panel; Future  -     amLODIPine (NORVASC) 5 MG tablet; Take 1 tablet by mouth Every Night.  Dispense: 90 tablet; Refill: 3  -     valsartan-hydrochlorothiazide (DIOVAN-HCT) 320-12.5 MG per tablet; Take 1 tablet by mouth Daily.  Dispense: 90 tablet; Refill: 3    2. Mixed hyperlipidemia  -     Comprehensive Metabolic Panel; Future  -     Lipid Panel; Future  -     TSH; Future  -      ezetimibe (ZETIA) 10 MG tablet; Take 1 tablet by mouth Every Night.  Dispense: 90 tablet; Refill: 3    3. Chronic venous insufficiency  -     Comprehensive Metabolic Panel; Future    4. Hyperglycemia  -     Hemoglobin A1c; Future    5. Memory loss  -     Vitamin B12 & Folate; Future    6. Vitamin D deficiency  -     Vitamin D 25 hydroxy; Future    7. Other long term (current) drug therapy  -     CBC (No Diff); Future  -     Vitamin B12 & Folate; Future  -     Vitamin D 25 hydroxy; Future    8. Fibromyalgia  -     DULoxetine (CYMBALTA) 20 MG capsule; Take 1 capsule by mouth Daily.  Dispense: 90 capsule; Refill: 3    Follow Up  Return in about 6 months (around 6/3/2025) for Recheck, Medicare Wellness.  Patient was given instructions and counseling regarding her condition or for health maintenance advice. Please see specific information pulled into the AVS if appropriate.

## 2025-01-22 ENCOUNTER — HOSPITAL ENCOUNTER (OUTPATIENT)
Dept: MAMMOGRAPHY | Facility: HOSPITAL | Age: 85
Discharge: HOME | End: 2025-01-22
Payer: MEDICARE

## 2025-01-22 ENCOUNTER — HOSPITAL ENCOUNTER (OUTPATIENT)
Dept: BONE DENSITY | Facility: HOSPITAL | Age: 85
Discharge: HOME | End: 2025-01-22
Payer: MEDICARE

## 2025-01-22 DIAGNOSIS — Z78.0 POSTMENOPAUSAL: ICD-10-CM

## 2025-01-22 DIAGNOSIS — Z12.31 ENCOUNTER FOR SCREENING MAMMOGRAM FOR MALIGNANT NEOPLASM OF BREAST: ICD-10-CM

## 2025-01-22 PROCEDURE — 77080 DXA BONE DENSITY AXIAL: CPT

## 2025-01-22 PROCEDURE — 77067 SCR MAMMO BI INCL CAD: CPT

## 2025-01-22 PROCEDURE — 77063 BREAST TOMOSYNTHESIS BI: CPT

## 2025-07-07 DIAGNOSIS — I87.2 CHRONIC VENOUS INSUFFICIENCY: ICD-10-CM

## 2025-07-07 DIAGNOSIS — J30.1 SEASONAL ALLERGIC RHINITIS DUE TO POLLEN: ICD-10-CM

## 2025-07-07 RX ORDER — FLUTICASONE PROPIONATE 50 MCG
2 SPRAY, SUSPENSION (ML) NASAL DAILY
Qty: 48 G | Refills: 3 | OUTPATIENT
Start: 2025-07-07

## 2025-07-07 RX ORDER — AMMONIUM LACTATE 12 G/100G
CREAM TOPICAL SEE ADMIN INSTRUCTIONS
Qty: 385 G | Refills: 3 | OUTPATIENT
Start: 2025-07-07

## 2025-07-07 RX ORDER — FLUTICASONE PROPIONATE 50 MCG
2 SPRAY, SUSPENSION (ML) NASAL DAILY
Qty: 48 G | Refills: 0 | OUTPATIENT
Start: 2025-07-07